# Patient Record
Sex: MALE | Race: BLACK OR AFRICAN AMERICAN | Employment: UNEMPLOYED | URBAN - METROPOLITAN AREA
[De-identification: names, ages, dates, MRNs, and addresses within clinical notes are randomized per-mention and may not be internally consistent; named-entity substitution may affect disease eponyms.]

---

## 2017-01-11 PROBLEM — Z87.448 HISTORY OF HEMATURIA: Status: ACTIVE | Noted: 2017-01-11

## 2017-02-14 ENCOUNTER — HOSPITAL ENCOUNTER (OUTPATIENT)
Dept: ONCOLOGY | Age: 37
Discharge: HOME | End: 2017-02-14

## 2017-02-14 ENCOUNTER — OFFICE VISIT (OUTPATIENT)
Dept: ONCOLOGY | Age: 37
End: 2017-02-14

## 2017-02-14 VITALS
TEMPERATURE: 97.5 F | SYSTOLIC BLOOD PRESSURE: 110 MMHG | WEIGHT: 165 LBS | BODY MASS INDEX: 25.09 KG/M2 | HEART RATE: 80 BPM | DIASTOLIC BLOOD PRESSURE: 65 MMHG

## 2017-02-14 DIAGNOSIS — D50.0 IRON DEFICIENCY ANEMIA DUE TO CHRONIC BLOOD LOSS: ICD-10-CM

## 2017-02-14 DIAGNOSIS — D68.59 THROMBOPHILIA (HCC): Primary | ICD-10-CM

## 2017-02-14 DIAGNOSIS — D68.59 THROMBOPHILIA (HCC): ICD-10-CM

## 2017-02-14 LAB
BASO+EOS+MONOS # BLD AUTO: 0.5 K/UL (ref 0–2.3)
BASO+EOS+MONOS # BLD AUTO: 8 % (ref 0.1–17)
DIFFERENTIAL METHOD BLD: NORMAL
ERYTHROCYTE [DISTWIDTH] IN BLOOD BY AUTOMATED COUNT: 13.4 % (ref 11.5–14.5)
HCT VFR BLD AUTO: 42.5 % (ref 36–48)
HGB BLD-MCNC: 15 G/DL (ref 12–16)
LYMPHOCYTES # BLD AUTO: 37 % (ref 14–44)
LYMPHOCYTES # BLD: 2.5 K/UL (ref 1.1–5.9)
MCH RBC QN AUTO: 30.2 PG (ref 25–35)
MCHC RBC AUTO-ENTMCNC: 35.3 G/DL (ref 31–37)
MCV RBC AUTO: 85.5 FL (ref 78–102)
NEUTS SEG # BLD: 3.8 K/UL (ref 1.8–9.5)
NEUTS SEG NFR BLD AUTO: 56 % (ref 40–70)
PLATELET # BLD AUTO: 214 K/UL (ref 140–440)
RBC # BLD AUTO: 4.97 M/UL (ref 4.1–5.1)
WBC # BLD AUTO: 6.8 K/UL (ref 4.5–13)

## 2017-02-14 NOTE — PROGRESS NOTES
Hematology/Oncology  Progress Note    Name: Cheikh Amaral  Date: 2017  : 1980  Primary Care Provider: Rafael Garza MD        Mr. Rebeca Callaway is a 43y.o. year old male with thrombophilia and HX of PE. Current Therapy: Xarelto 20mg daily    Subjective:   Mr. Rebeca Callaway is a 43year old male with a PE following the removal of part of his kidney. His recent work up to determine if there was a genetic link as to the cause of the PE was negative. He was therefore  started on Xarelto 20mg daily. He states his GI issues have resolved and he states he has been doing well. He denies abnormal bleeding or bruising. He states it has been 6 months since he started the xarelto. He has no additional complaints or questions  at this time. The past medical, surgical and social history has been reviewed and remains unchanged. Past Medical History   Diagnosis Date    Anemia     Celiac artery aneurysm (HCC)     Diabetes ([facility] [address]     Hx of renal cell cancer     Hypercholesteremia     Hypertension     IFG (impaired fasting glucose)     Left renal mass     Lower GI bleed     Migraine without aura and with status migrainosus, not intractable     Pulmonary embolism with infarction (HCC)     Renal cell cancer ([facility] [address]     Thyroid nodule     Vitamin D deficiency      Past Surgical History   Procedure Laterality Date    Hx nephrectomy  16     Robotic assisted laparoscopic left partial nephrectomy    Colonoscopy N/A 8/15/2016     COLONOSCOPY, DIAGNOSTIC performed by Bogdan Kessler MD at [address] [place] [address] Colonoscopy N/A 2016     COLONOSCOPY, DIAGNOSTIC with bx performed by Ema Khan MD at [address] [place] [address] Hx vasectomy      Nephrectomy       Social History     Social History    Marital status: LEGALLY      Spouse name: N/A    Number of children: N/A    Years of education: N/A     Occupational History    Not on file.      Social History Main Topics    Smoking status: Former Smoker    Smokeless tobacco: Not on file    Alcohol use No    Drug use: Not on file    Sexual activity: Not on file     Other Topics Concern    Not on file     Social History Narrative     Family History   Problem Relation Age of Onset    Cancer Mother     Hypertension Mother     Hypertension Father     Cancer Maternal Uncle      Current Outpatient Prescriptions   Medication Sig Dispense Refill    rivaroxaban (XARELTO) 20 mg tab tablet Take 1 Tab by mouth daily. Begin taking 20mg tab once daily, after completing 21 days of 15mg twice daily 30 Tab 4    furosemide (LASIX) 20 mg tablet Take 20 mg by mouth daily.  temazepam (RESTORIL) 15 mg capsule       traMADol (ULTRAM) 50 mg tablet Take 50 mg by mouth daily as needed.  docusate sodium (COLACE) 100 mg capsule Take 100 mg by mouth daily.  therapeutic multivitamin (THERAGRAN) tablet Take 1 Tab by Mouth Once a Day.  amLODIPine (NORVASC) 10 mg tablet Take 10 mg by mouth daily. 6    atorvastatin (LIPITOR) 20 mg tablet Take 20 mg by mouth daily. 6    losartan (COZAAR) 25 mg tablet Take 25 mg by mouth daily. 6    ASACOL  mg DR tablet Take 800 mg by mouth three (3) times daily.  metoprolol (LOPRESSOR) 25 mg tablet Take 25 mg by mouth two (2) times a day. 6    nortriptyline (PAMELOR) 50 mg capsule Take 50 mg by mouth nightly. 0    sertraline (ZOLOFT) 50 mg tablet Take 50 mg by mouth daily.  SUMAtriptan (IMITREX) 100 mg tablet   0    topiramate (TOPAMAX) 50 mg tablet 50 mg two (2) times a day. Review of Systems  General :The patient has no complaints and there is no physical distress evident. Psychological : patient denies having any psychological symptoms such as hallucinations depression or anxiety. Ophthalmic:the patient denies having any visual impairment or eye discomfort. ENT: there are no abnormalities reported.    Allergy and Immunology:the patient denies having any seasonal allergies or allergies to medications other than those already outlined above. Hematological and Lymphatic: the patient denies having any bruising, bleeding or lymphadenopathy. Endocrine: the patient denies having any heat or cold intolerance. There is no history of diabetes or thyroid disorders. Breast: the patient denies having any history of breast mass or lumps. Respiratory:the patient denies having any cough, shortness of breath, or dyspnea on exertion. Cardiovascular: there are no complaints of chest pain, palpitations, chest pounding, or dyspnea on exertion. Gastrointestinal: the patient denies having nausea, emesis, diarrhea, constipation. Occasional blood in the stools due to hemorrhoids. Genito-Urinary: the patient denies having urinary urgency, frequency, or dysuria. Musculoskeletal: with the exception of mild arthralgias the patient has no other musculoskeletal complaints. Neurological:  denies having any numbness, tingling, or neurologic deficits. Dermatological: patient denies having any unexplained rash, skin ulcerations, or hives. Objective:     Visit Vitals    /65    Pulse 80    Temp 97.5 °F (36.4 °C)    Wt 74.8 kg (165 lb)    BMI 25.09 kg/m2         Physical Exam:   General: Well appearing, in NAD  Skin: examination of the skin reveals no bruising, rash or petechiae  HEENT: Normocephalic, atraumatic. Conjunctiva and sclera are clear. Pupils are equal, round and reactive to light. EOMs are intact. ENT without oral mucosal lesions, stomatitis or thrush  Neck: supple without lymphadenopathy, JVD or thyromegaly  Lymphatics: no palpable cervical, supraclavicular, axillary or inguinal lymphadenopathy  Lungs: clear breath sounds bilaterally, no rhonchi or wheezes noted  Heart: Regular rate and rhythm, no murmurs, rubs or gallops, S1-S2 noted.  Positive peripheral pulses bilaterally upper and lower extremities  Abdomen: soft, non-tender, non-distended, no HSM, positive bowel sounds  Extremities: without clubbing, cyanosis or edema  Neurologic: no focal deficits, steady gait, Alert and oriented x 3. Psychologic: mood and affect are appropriate, no anxiety or depression noted    Laboratory Data:     Results for orders placed or performed during the hospital encounter of 02/14/17   CBC WITH 3 PART DIFF     Status: None   Result Value Ref Range Status    WBC 6.8 4.5 - 13.0 K/uL Final    RBC 4.97 4.10 - 5.10 M/uL Final    HGB 15.0 12.0 - 16.0 g/dL Final    HCT 42.5 36 - 48 % Final    MCV 85.5 78 - 102 FL Final    MCH 30.2 25.0 - 35.0 PG Final    MCHC 35.3 31 - 37 g/dL Final    RDW 13.4 11.5 - 14.5 % Final    PLATELET 261 533 - 680 K/uL Final    NEUTROPHILS 56 40 - 70 % Final    MIXED CELLS 8 0.1 - 17 % Final    LYMPHOCYTES 37 14 - 44 % Final    ABS. NEUTROPHILS 3.8 1.8 - 9.5 K/UL Final    ABS. MIXED CELLS 0.5 0.0 - 2.3 K/uL Final    ABS. LYMPHOCYTES 2.5 1.1 - 5.9 K/UL Final     Comment: Test performed at [place] [address] Location. Results Reviewed by Medical Director.     DF AUTOMATED   Final            Patient Active Problem List   Diagnosis Code    Left renal mass N28.89    Celiac artery aneurysm (HCC) I72.8    Vitamin D deficiency E55.9    Migraine without aura and responsive to treatment G43.009    Lower gastrointestinal hemorrhage K92.2    Impaired fasting glucose R73.01    Hypertension I10    Hypercholesterolemia E78.00    Hemorrhoids K64.9    Essential hypertension I10    Chest pain R07.9    Hematochezia K92.1    Anxiety F41.9    Pulmonary embolus, right (HCC) I26.99    Thrombophilia (HCC) D68.59    Iron deficiency anemia due to chronic blood loss D50.0    Renal cell cancer (HCC) C64.9    Diabetes (HCC) E11.9    Thyroid nodule E04.1    Pulmonary embolism with infarction (HCC) I26.99    Migraine without aura and with status migrainosus, not intractable G43.001    Lower GI bleed K92.2    IFG (impaired fasting glucose) R73.01    Hypercholesteremia E78.00    Personal history of other malignant neoplasm of kidney Z85.528    Anemia D64.9    Depression F32.9    History of pulmonary embolism Z86.711    Malignant neoplasm of kidney (HCC) C64.9    Controlled type 2 diabetes mellitus without complication (HCC) J83.2    History of hematuria Z87.448         Assessment:     1. Thrombophilia ([facility] [address]    2. Iron deficiency anemia due to chronic blood loss          Plan: Thrombophilia:  The patient will continue to take the Xarelto 20 mg daily. He has completed his 6 months of Xarelto as prescribed. I will order a CTA of the chest, abdomen and pelvis to determine the status of the clot. Once the results are received the patient will be contacted to let him know if the Xarelto needs to be continued or if he can stop the xarelto and take an ASA daily. We will see him in 3 months or sooner if indicated.       Follow-up Disposition: Not on File   Orders Placed This Encounter    COMPLETE CBC & AUTO DIFF WBC    InHouse CBC ([employer])     Standing Status:   Future     Number of Occurrences:   1     Standing Expiration Date:   6/28/8023    METABOLIC PANEL, COMPREHENSIVE     Standing Status:   Future     Standing Expiration Date:   2/15/2018       Sulma Higgins NP  2/14/2017

## 2017-02-15 LAB
A-G RATIO,AGRAT: 1.7 RATIO (ref 1.1–2.6)
ALBUMIN SERPL-MCNC: 4.7 G/DL (ref 3.5–5)
ALP SERPL-CCNC: 75 U/L (ref 25–115)
ALT SERPL-CCNC: 18 U/L (ref 5–40)
ANION GAP SERPL CALC-SCNC: 20 MMOL/L
AST SERPL W P-5'-P-CCNC: 23 U/L (ref 10–37)
BILIRUB SERPL-MCNC: 0.3 MG/DL (ref 0.2–1.2)
BUN SERPL-MCNC: 19 MG/DL (ref 6–22)
CALCIUM SERPL-MCNC: 9.8 MG/DL (ref 8.4–10.4)
CHLORIDE SERPL-SCNC: 100 MMOL/L (ref 98–110)
CO2 SERPL-SCNC: 22 MMOL/L (ref 20–32)
CREAT SERPL-MCNC: 1.3 MG/DL (ref 0.5–1.2)
GFRAA, 66117: >60
GFRNA, 66118: 59.7
GLOBULIN,GLOB: 2.8 G/DL (ref 2–4)
GLUCOSE SERPL-MCNC: 117 MG/DL (ref 65–99)
POTASSIUM SERPL-SCNC: 3.2 MMOL/L (ref 3.5–5.5)
PROT SERPL-MCNC: 7.5 G/DL (ref 6.4–8.3)
SODIUM SERPL-SCNC: 142 MMOL/L (ref 133–145)

## 2017-03-13 ENCOUNTER — DOCUMENTATION ONLY (OUTPATIENT)
Dept: ONCOLOGY | Age: 37
End: 2017-03-13

## 2017-10-16 PROBLEM — G47.33 SLEEP APNEA, OBSTRUCTIVE: Status: ACTIVE | Noted: 2017-08-10

## 2018-06-20 PROBLEM — N46.8 INFERTILITY, MALE, EXTRATESTICULAR CAUSE: Status: ACTIVE | Noted: 2018-06-20

## 2019-04-18 ENCOUNTER — HOSPITAL ENCOUNTER (EMERGENCY)
Dept: HOSPITAL 62 - ER | Age: 39
Discharge: HOME | End: 2019-04-18
Payer: OTHER GOVERNMENT

## 2019-04-18 VITALS — DIASTOLIC BLOOD PRESSURE: 103 MMHG | SYSTOLIC BLOOD PRESSURE: 152 MMHG

## 2019-04-18 DIAGNOSIS — R63.0: ICD-10-CM

## 2019-04-18 DIAGNOSIS — R19.5: Primary | ICD-10-CM

## 2019-04-18 DIAGNOSIS — R51: ICD-10-CM

## 2019-04-18 DIAGNOSIS — R10.9: ICD-10-CM

## 2019-04-18 LAB
ADD MANUAL DIFF: NO
ALBUMIN SERPL-MCNC: 4.4 G/DL (ref 3.5–5)
ALP SERPL-CCNC: 62 U/L (ref 38–126)
ALT SERPL-CCNC: 28 U/L (ref 21–72)
ANION GAP SERPL CALC-SCNC: 6 MMOL/L (ref 5–19)
APPEARANCE UR: CLEAR
APTT BLD: 31.8 SEC (ref 23.5–35.8)
APTT PPP: YELLOW S
AST SERPL-CCNC: 32 U/L (ref 17–59)
BASOPHILS # BLD AUTO: 0.1 10^3/UL (ref 0–0.2)
BASOPHILS NFR BLD AUTO: 0.7 % (ref 0–2)
BILIRUB DIRECT SERPL-MCNC: 0.2 MG/DL (ref 0–0.4)
BILIRUB SERPL-MCNC: 0.5 MG/DL (ref 0.2–1.3)
BILIRUB UR QL STRIP: NEGATIVE
BUN SERPL-MCNC: 17 MG/DL (ref 7–20)
CALCIUM: 10.1 MG/DL (ref 8.4–10.2)
CHLORIDE SERPL-SCNC: 105 MMOL/L (ref 98–107)
CO2 SERPL-SCNC: 29 MMOL/L (ref 22–30)
EOSINOPHIL # BLD AUTO: 0.1 10^3/UL (ref 0–0.6)
EOSINOPHIL NFR BLD AUTO: 1.9 % (ref 0–6)
ERYTHROCYTE [DISTWIDTH] IN BLOOD BY AUTOMATED COUNT: 13.9 % (ref 11.5–14)
GLUCOSE SERPL-MCNC: 79 MG/DL (ref 75–110)
GLUCOSE UR STRIP-MCNC: NEGATIVE MG/DL
HCT VFR BLD CALC: 47.3 % (ref 37.9–51)
HGB BLD-MCNC: 16 G/DL (ref 13.5–17)
INR PPP: 0.95
KETONES UR STRIP-MCNC: NEGATIVE MG/DL
LYMPHOCYTES # BLD AUTO: 3 10^3/UL (ref 0.5–4.7)
LYMPHOCYTES NFR BLD AUTO: 41.6 % (ref 13–45)
MCH RBC QN AUTO: 30.1 PG (ref 27–33.4)
MCHC RBC AUTO-ENTMCNC: 33.9 G/DL (ref 32–36)
MCV RBC AUTO: 89 FL (ref 80–97)
MONOCYTES # BLD AUTO: 0.5 10^3/UL (ref 0.1–1.4)
MONOCYTES NFR BLD AUTO: 7.1 % (ref 3–13)
NEUTROPHILS # BLD AUTO: 3.5 10^3/UL (ref 1.7–8.2)
NEUTS SEG NFR BLD AUTO: 48.7 % (ref 42–78)
NITRITE UR QL STRIP: NEGATIVE
PH UR STRIP: 6 [PH] (ref 5–9)
PLATELET # BLD: 192 10^3/UL (ref 150–450)
POTASSIUM SERPL-SCNC: 4 MMOL/L (ref 3.6–5)
PROT SERPL-MCNC: 7.9 G/DL (ref 6.3–8.2)
PROT UR STRIP-MCNC: NEGATIVE MG/DL
PROTHROMBIN TIME: 13.1 SEC (ref 11.4–15.4)
RBC # BLD AUTO: 5.33 10^6/UL (ref 4.35–5.55)
SODIUM SERPL-SCNC: 140.4 MMOL/L (ref 137–145)
SP GR UR STRIP: 1.01
TOTAL CELLS COUNTED % (AUTO): 100 %
UROBILINOGEN UR-MCNC: NEGATIVE MG/DL (ref ?–2)
WBC # BLD AUTO: 7.3 10^3/UL (ref 4–10.5)

## 2019-04-18 PROCEDURE — 99284 EMERGENCY DEPT VISIT MOD MDM: CPT

## 2019-04-18 PROCEDURE — 86850 RBC ANTIBODY SCREEN: CPT

## 2019-04-18 PROCEDURE — 85730 THROMBOPLASTIN TIME PARTIAL: CPT

## 2019-04-18 PROCEDURE — 80053 COMPREHEN METABOLIC PANEL: CPT

## 2019-04-18 PROCEDURE — 85610 PROTHROMBIN TIME: CPT

## 2019-04-18 PROCEDURE — 96375 TX/PRO/DX INJ NEW DRUG ADDON: CPT

## 2019-04-18 PROCEDURE — 85025 COMPLETE CBC W/AUTO DIFF WBC: CPT

## 2019-04-18 PROCEDURE — 86900 BLOOD TYPING SEROLOGIC ABO: CPT

## 2019-04-18 PROCEDURE — 81001 URINALYSIS AUTO W/SCOPE: CPT

## 2019-04-18 PROCEDURE — 36415 COLL VENOUS BLD VENIPUNCTURE: CPT

## 2019-04-18 PROCEDURE — 96361 HYDRATE IV INFUSION ADD-ON: CPT

## 2019-04-18 PROCEDURE — 74177 CT ABD & PELVIS W/CONTRAST: CPT

## 2019-04-18 PROCEDURE — 96374 THER/PROPH/DIAG INJ IV PUSH: CPT

## 2019-04-18 PROCEDURE — 86901 BLOOD TYPING SEROLOGIC RH(D): CPT

## 2019-04-18 NOTE — RADIOLOGY REPORT (SQ)
EXAM DESCRIPTION:  CT ABD/PELVIS WITH IV ONLY



COMPLETED DATE/TIME:  4/18/2019 5:21 pm



REASON FOR STUDY:  abdominal pain



COMPARISON:  None.



TECHNIQUE:  CT scan of the abdomen and pelvis performed using helical scanning technique with dynamic
 intravenous contrast injection.  No oral contrast. Images reviewed with lung, soft tissue, and bone 
windows. Reconstructed coronal and sagittal MPR images reviewed. Delayed images for evaluation of the
 urinary system also acquired. All images stored on PACS.

All CT scanners at this facility use dose modulation, iterative reconstruction, and/or weight based d
osing when appropriate to reduce radiation dose to as low as reasonably achievable (ALARA).

[facility]: Dose Right  [facility]: CareDose    [facility]: Dose Right    [facility]: Teradose 4D    [facility]: Smart Technologies



CONTRAST TYPE AND DOSE:  contrast/concentration: Isovue 350.00 mg/ml; Total Contrast Delivered: 94.0 
ml; Total Saline Delivered: 71.0 ml



RENAL FUNCTION:  None required. The patient is less than 50 years old.



RADIATION DOSE:  CT Rad equipment meets quality standard of care and radiation dose reduction techniq
ues were employed. CTDIvol: 6.6 - 9.1 mGy. DLP: 833 mGy-cm..



LIMITATIONS:  None.



FINDINGS:  LOWER CHEST: No significant findings. No nodules or infiltrates.

LIVER: Normal size. No masses.  No dilated ducts.

SPLEEN: Normal size. No focal lesions.

PANCREAS: No masses. No significant calcifications. No adjacent inflammation or peripancreatic fluid 
collections. Pancreatic duct not dilated.

GALLBLADDER: No identified stones by CT criteria. No inflammatory changes to suggest cholecystitis.

ADRENAL GLANDS: No significant masses or asymmetry.

RIGHT KIDNEY AND URETER: No solid masses.   Nonobstructive calculus of the superior pole.   No hydron
ephrosis or hydroureter.

LEFT KIDNEY AND URETER: No solid masses.   Tiny nonobstructive calculus of the inferior pole.   No hy
dronephrosis or hydroureter.

AORTA AND VESSELS: No aneurysm. No dissection. Renal arteries, SMA, celiac without stenosis.

RETROPERITONEUM: No retroperitoneal adenopathy, hemorrhage or masses.

BOWEL AND PERITONEAL CAVITY: No masses or inflammatory changes. No free fluid or peritoneal masses.  
The sigmoid colon is very redundant.  There is a segment of narrowed appearing sigmoid colon approxim
ately 3 cm in length (series 3, image 68) with gas filled colon proximally.  There is stool distally 
in the rectum.

APPENDIX: Surgically absent.

PELVIS: No mass.  No free fluid. Normal bladder.

ABDOMINAL WALL: No masses. No hernias.

BONES: No significant or acute findings.

OTHER: No other significant finding.



IMPRESSION:  1.  No definite CT findings to explain left lower quadrant abdominal pain.  The sigmoid 
colon is very redundant.  There is a segment of narrowed appearing sigmoid colon approximately 3 cm i
n length with gas-filled colon proximally.  There is stool distally in the rectum.  Although this is 
likely peristaltic bowel, stricture can have this appearance.  Correlate for clinical history of rele
vant disorder such as inflammatory bowel disease related to stricture.  This can be further evaluated
 by direct colonoscopic visualization if desired.

2.  Nonobstructive bilateral nephrolithiasis.



TECHNICAL DOCUMENTATION:  JOB ID:  [id number]

Quality ID # 436: Final reports with documentation of one or more dose reduction techniques (e.g., Au
tomated exposure control, adjustment of the mA and/or kV according to patient size, use of iterative 
reconstruction technique)

 2011 [employer]- All Rights Reserved



Reading location - IP/workstation name: [email or web address]

## 2019-04-18 NOTE — ER DOCUMENT REPORT
ED Medical Screen (RME)





- General


Chief Complaint: Bloody Stools


Stated Complaint: BLOOD IN STOOL


Time Seen by Provider: 04/18/19 12:31


Mode of Arrival: Ambulatory


Information source: Patient


Notes: 





39-year-old male presented to ED for complaint of blood in his stools x3 since 

Tuesday.  He states there was a little bit on Tuesday and on a lot more each 

day.  He states today that was a lot.  Patient has a history of kidney cancer 

with part of his left kidney removed.  He has had a aortic aneurysm that was not

repaired just use medication and he had to have a history of high blood pressure

.  He is alert oriented respirations regular and unlabored speaking in full 

sentences walks with a even steady gait.  He states he does not smoke drink or 

drugs and lives with his family.








I have greeted and performed a rapid initial assessment of this patient.  A 

comprehensive ED assessment and evaluation of the patient, analysis of test 

results and completion of medical decision making process will be conducted by 

an additional ED providers.


TRAVEL OUTSIDE OF THE [place] IN LAST 30 DAYS: No





- Related Data


Allergies/Adverse Reactions: 


                                        





No Known Allergies Allergy (Verified 04/18/19 11:47)


   








Home Medications: HCTZ.  ZOLOFT.  CYCLOBENAZAPRIL.  IRON.  LOSATRTAN.  

CETIRIZINE.  DOXYCYCLINE.  HYDROCHLORATHIAZIDE.  VIT D3.  ASA.  PROETHAZINE.  

COLACE.  TRAZADONE





Past Medical History





- Social History


Chew tobacco use (# tins/day): No


Frequency of alcohol use: None


Drug Abuse: None





- Past Medical History


Cardiac Medical History: Reports: Hx Hypertension


Renal/ Medical History: Denies: Hx Peritoneal Dialysis


Past Surgical History: Reports: Hx Kidney (Renal Surgery) - left sided partial 

removal





Physical Exam





- Vital signs


Vitals: 





                                        











Temp Pulse Resp BP Pulse Ox


 


 97.7 F   78   16   146/96 H  97 


 


 04/18/19 11:50  04/18/19 11:50  04/18/19 11:50  04/18/19 11:50  04/18/19 11:50














Course





- Vital Signs


Vital signs: 





                                        











Temp Pulse Resp BP Pulse Ox


 


 97.7 F   78   16   146/96 H  97 


 


 04/18/19 11:50  04/18/19 11:50  04/18/19 11:50  04/18/19 11:50  04/18/19 11:50

## 2019-04-18 NOTE — ER DOCUMENT REPORT
ED General





- General


Chief Complaint: Bloody Stools


Stated Complaint: BLOOD IN STOOL


Time Seen by Provider: 04/18/19 12:31


Primary Care Provider: 


KVNG ENCISO MD [ACTIVE STAFF] - Follow up in 3-5 days


[facility] [Primary Care Provider] - Follow up as needed


Mode of Arrival: Ambulatory


TRAVEL OUTSIDE OF THE [place] IN LAST 30 DAYS: No





- HPI


Notes: 





Patient is a 39-year-old male that presents to the emergency department for 

chief complaint of blood in his stool.


Patient reports for the last 3 days he has had bright red blood in his stool.  

He states he has 2 formed bowel movements daily.  Each 1 has had streaks of 

bright red blood on the paper and in the toilet bowl.  He states the stool also 

appears bright red.  He denies any maroon or black stools.  He reports some mild

diffuse abdominal pain that is intermittent and crampy in nature.  He denies 

associated fevers, nausea and vomiting.  He does report decreased oral intake 

because of not feeling well.  Patient states since onset of the bloody stools he

is also had a headache.  He describes it as a throbbing pressure that has 

associated photophobia.  The pain is located around his right parietal region.  

He does report history of migraines in the past but states he gets them very 

rarely.  He states this headache was gradual in onset and has been constant for 

the last 3 days.  He did get some symptomatic relief at home after taking 

Tylenol yesterday.  He denies any associated vision changes, numbness, weakness 

and neck stiffness patient states he has had about 6 colonoscopies in his li

fetime and all have been normal or shown small polyps.  He denies history of GI 

bleeding in the past.  He denies being on anticoagulation currently.





Past Medical History: Kidney cancer, PE, hypertension





Past Surgical History: Left partial nephrectomy, appendectomy





Social History: Denies tobacco and alcohol use





Family History: Reviewed and noncontributory for presenting illness





Allergies: Reviewed, see documented allergy list.








REVIEW OF SYSTEMS:





CONSTITUTIONAL : 





No fever





No chills





No diaphoresis





No recent illness





EENT:





No vision changes





No congestion





No sore throat  





CARDIOVASCULAR:





No chest pain





No palpitations





RESPIRATORY:





No shortness of breath





No cough





No difficulty breathing





GASTROINTESTINAL: 





 abdominal pain





No nausea





No vomiting





No diarrhea





Bright red blood in stool





GENITOURINARY:





No dysuria





No hematuria





No difficulty urinating





MUSCULOSKELETAL:





No back pain





No leg pain





No arm pain





SKIN:  





No rashes





No lesions





LYMPHATIC: 





No swollen, enlarged glands.





NEUROLOGICAL: 





No lightheadedness





headache





No weakness





No paresthesias





PSYCHIATRIC:





No anxiety





No depression 








PHYSICAL EXAMINATION:





Vital signs reviewed, nursing noted reviewed.





GENERAL: Well-appearing, well-nourished and in no acute distress.





HEAD: Atraumatic, normocephalic.





EYES: Eyes appear normal, extraocular movements intact, sclera anicteric, 

conjunctiva are normal.





ENT: nares patent, oropharynx clear without exudates.  Moist mucous membranes.





NECK: Normal range of motion, supple without lymphadenopathy





LUNGS: Breath sounds clear to auscultation bilaterally and equal.  No wheezes 

rales or rhonchi.





HEART: Regular rate and rhythm without murmurs





ABDOMEN: Soft, mild right lateral abdominal tenderness, normoactive bowel 

sounds.  No rebound, guarding, or rigidity. No masses appreciated.





EXTREMITIES: Nontender, good range of motion, no pitting or edema. 





NEUROLOGICAL: No focal neurological deficits. Moves all extremities 

spontaneously Motor and sensory grossly intact on exam.





PSYCH: Normal mood, normal affect.





SKIN: Warm, Dry, normal turgor, no rashes or lesions noted on exposed skin











- Related Data


Allergies/Adverse Reactions: 


                                        





No Known Allergies Allergy (Verified 04/18/19 11:47)


   








Home Medications: HCTZ.  ZOLOFT.  CYCLOBENAZAPRIL.  IRON.  LOSATRTAN.  

CETIRIZINE.  DOXYCYCLINE.  HYDROCHLORATHIAZIDE.  VIT D3.  ASA.  PROETHAZINE.  

COLACE.  TRAZADONE





Past Medical History





- General


Information source: Patient





- Social History


Smoking Status: Never Smoker


Chew tobacco use (# tins/day): No


Frequency of alcohol use: None


Drug Abuse: None


Family History: Reviewed & Not Pertinent


Patient has suicidal ideation: No


Patient has homicidal ideation: No





- Past Medical History


Cardiac Medical History: Reports: Hx Hypertension


Renal/ Medical History: Denies: Hx Peritoneal Dialysis


Past Surgical History: Reports: Hx Kidney (Renal Surgery) - left sided partial 

removal





Physical Exam





- Vital signs


Vitals: 


                                        











Temp Pulse Resp BP Pulse Ox


 


 97.7 F   78   16   146/96 H  97 


 


 04/18/19 11:50  04/18/19 11:50  04/18/19 11:50  04/18/19 11:50  04/18/19 11:50














Course





- Re-evaluation


Re-evalutation: 





04/18/19 16:57


Vitals reviewed per nursing notes reviewed.  Patient's abdomen is soft with some

mild right-sided tenderness.  He is status post appendectomy.  Patient has no 

leukocytosis.  He appears well-hydrated with normal kidney function and no 

electrolyte derangement.  He is not anemic and has a stable hemoglobin.  Patient

does have a history of renal cancer and had been advised to have frequent 

colonoscopies to screen for further cancer.  With his current presentation of 

bloody stools without diarrhea fevers or emesis I am concerned for possible 

malignant mass causing the bleeding.  CT scan will be performed of his abdomen 

and pelvis today.  Patient was also recommended that he needs colonoscopy in the

near future.


04/18/19 18:13


Patient CT scan shows collapse of the colon distally with some proximal air.  

Patient has been able to pass bowel movements daily and has passed gas in the 

ER.  He does not have symptoms of obstruction to suggest a stricture.  Likely 

this was peristalsis.  His abdominal exam is soft he has no left lower quadrant 

tenderness, he is still mildly tender on the right side.  Patient was encouraged

to follow with GI for outpatient colonoscopy.  He was extensively counseled on 

return precautions and told to have a low threshold to return to the emergency 

room if the bleeding is continuing or worsening.  He is in agreement with this 

plan of care and stable at discharge





Laboratory











  04/18/19 04/18/19 04/18/19





  12:55 12:55 12:55


 


WBC  7.3  


 


RBC  5.33  


 


Hgb  16.0  


 


Hct  47.3  


 


MCV  89  


 


MCH  30.1  


 


MCHC  33.9  


 


RDW  13.9  


 


Plt Count  192  


 


Seg Neutrophils %  48.7  


 


Lymphocytes %  41.6  


 


Monocytes %  7.1  


 


Eosinophils %  1.9  


 


Basophils %  0.7  


 


Absolute Neutrophils  3.5  


 


Absolute Lymphocytes  3.0  


 


Absolute Monocytes  0.5  


 


Absolute Eosinophils  0.1  


 


Absolute Basophils  0.1  


 


PT   


 


INR   


 


APTT   


 


Sodium   140.4 


 


Potassium   4.0 


 


Chloride   105 


 


Carbon Dioxide   29 


 


Anion Gap   6 


 


BUN   17 


 


Creatinine   1.07 


 


Est GFR ( Amer)   > 60 


 


Est GFR (Non-Af Amer)   > 60 


 


Glucose   79 


 


Calcium   10.1 


 


Total Bilirubin   0.5 


 


Direct Bilirubin   0.2 


 


Neonat Total Bilirubin   Not Reportable 


 


Neonat Direct Bilirubin   Not Reportable 


 


Neonat Indirect Bili   Not Reportable 


 


AST   32 


 


ALT   28 


 


Alkaline Phosphatase   62 


 


Total Protein   7.9 


 


Albumin   4.4 


 


Urine Color    YELLOW


 


Urine Appearance    CLEAR


 


Urine pH    6.0


 


Ur Specific Gravity    1.008


 


Urine Protein    NEGATIVE


 


Urine Glucose (UA)    NEGATIVE


 


Urine Ketones    NEGATIVE


 


Urine Blood    NEGATIVE


 


Urine Nitrite    NEGATIVE


 


Urine Bilirubin    NEGATIVE


 


Urine Urobilinogen    NEGATIVE


 


Ur Leukocyte Esterase    NEGATIVE


 


Urine WBC (Auto)    1


 


Urine Mucus (Auto)    RARE


 


Urine Ascorbic Acid    NEGATIVE


 


Blood Type   


 


Antibody Screen   














  04/18/19 04/18/19





  15:50 15:50


 


WBC  


 


RBC  


 


Hgb  


 


Hct  


 


MCV  


 


MCH  


 


MCHC  


 


RDW  


 


Plt Count  


 


Seg Neutrophils %  


 


Lymphocytes %  


 


Monocytes %  


 


Eosinophils %  


 


Basophils %  


 


Absolute Neutrophils  


 


Absolute Lymphocytes  


 


Absolute Monocytes  


 


Absolute Eosinophils  


 


Absolute Basophils  


 


PT  13.1 


 


INR  0.95 


 


APTT  31.8 


 


Sodium  


 


Potassium  


 


Chloride  


 


Carbon Dioxide  


 


Anion Gap  


 


BUN  


 


Creatinine  


 


Est GFR ( Amer)  


 


Est GFR (Non-Af Amer)  


 


Glucose  


 


Calcium  


 


Total Bilirubin  


 


Direct Bilirubin  


 


Neonat Total Bilirubin  


 


Neonat Direct Bilirubin  


 


Neonat Indirect Bili  


 


AST  


 


ALT  


 


Alkaline Phosphatase  


 


Total Protein  


 


Albumin  


 


Urine Color  


 


Urine Appearance  


 


Urine pH  


 


Ur Specific Gravity  


 


Urine Protein  


 


Urine Glucose (UA)  


 


Urine Ketones  


 


Urine Blood  


 


Urine Nitrite  


 


Urine Bilirubin  


 


Urine Urobilinogen  


 


Ur Leukocyte Esterase  


 


Urine WBC (Auto)  


 


Urine Mucus (Auto)  


 


Urine Ascorbic Acid  


 


Blood Type   A POSITIVE


 


Antibody Screen   NEGATIVE











                                        





Abdomen/Pelvis CT  04/18/19 16:24


IMPRESSION:  1.  No definite CT findings to explain left lower quadrant 

abdominal pain.  The sigmoid colon is very redundant.  There is a segment of 

narrowed appearing sigmoid colon approximately 3 cm in length with gas-filled 

colon proximally.  There is stool distally in the rectum.  Although this is 

likely peristaltic bowel, stricture can have this appearance.  Correlate for 

clinical history of relevant disorder such as inflammatory bowel disease related

to stricture.  This can be further evaluated by direct colonoscopic 

visualization if desired.


2.  Nonobstructive bilateral nephrolithiasis.


 














- Vital Signs


Vital signs: 


                                        











Temp Pulse Resp BP Pulse Ox


 


 97.7 F   78   16   146/96 H  97 


 


 04/18/19 11:50  04/18/19 11:50  04/18/19 11:50  04/18/19 11:50  04/18/19 11:50














- Laboratory


Result Diagrams: 


                                 04/18/19 12:55





                                 04/18/19 12:55





Discharge





- Discharge


Clinical Impression: 


 Blood in stool, oscar





Condition: Stable


Disposition: HOME, SELF-CARE


Instructions:  Rectal Bleeding, Unclear Cause ([facility])


Additional Instructions: 


Return to the emergency room if your bleeding continues or worsens 





please return to the emergency department if you have any worsening, or concern 

of your symptoms.





Please return to the emergency department if you develop chest pain, difficulty 

breathing, severe abdominal pain, or ongoing vomiting.





Please follow-up with your primary care physician in 2-3 days and any other 

recommended physicians.





If prescribed, take all medications as directed. 





If you have any questions or concerns do not hesitate to return the emergency 

department for evaluation.





Follow-up with Dr. Enciso for reevaluation and likely colonoscopy.











Forms:  Return to Work


Referrals: 


[facility] [Primary Care Provider] - Follow up as needed


KVNG ENCISO MD [ACTIVE STAFF] - Follow up in 3-5 days

## 2019-10-24 ENCOUNTER — HOSPITAL ENCOUNTER (EMERGENCY)
Dept: HOSPITAL 62 - ER | Age: 39
Discharge: HOME | End: 2019-10-24
Payer: OTHER GOVERNMENT

## 2019-10-24 VITALS — SYSTOLIC BLOOD PRESSURE: 150 MMHG | DIASTOLIC BLOOD PRESSURE: 96 MMHG

## 2019-10-24 DIAGNOSIS — Z86.711: ICD-10-CM

## 2019-10-24 DIAGNOSIS — I10: ICD-10-CM

## 2019-10-24 DIAGNOSIS — R06.02: ICD-10-CM

## 2019-10-24 DIAGNOSIS — E11.9: ICD-10-CM

## 2019-10-24 DIAGNOSIS — R07.89: Primary | ICD-10-CM

## 2019-10-24 LAB
ADD MANUAL DIFF: NO
ALBUMIN SERPL-MCNC: 4.3 G/DL (ref 3.5–5)
ALP SERPL-CCNC: 70 U/L (ref 38–126)
ANION GAP SERPL CALC-SCNC: 8 MMOL/L (ref 5–19)
APPEARANCE UR: CLEAR
APTT BLD: 30.8 SEC (ref 23.5–35.8)
APTT PPP: YELLOW S
AST SERPL-CCNC: 34 U/L (ref 17–59)
BASOPHILS # BLD AUTO: 0 10^3/UL (ref 0–0.2)
BASOPHILS NFR BLD AUTO: 0.2 % (ref 0–2)
BILIRUB DIRECT SERPL-MCNC: 0 MG/DL (ref 0–0.4)
BILIRUB SERPL-MCNC: 0.4 MG/DL (ref 0.2–1.3)
BILIRUB UR QL STRIP: NEGATIVE
BUN SERPL-MCNC: 15 MG/DL (ref 7–20)
CALCIUM: 9.7 MG/DL (ref 8.4–10.2)
CHLORIDE SERPL-SCNC: 103 MMOL/L (ref 98–107)
CK MB SERPL-MCNC: 0.86 NG/ML (ref ?–4.55)
CK SERPL-CCNC: 286 U/L (ref 55–170)
CO2 SERPL-SCNC: 28 MMOL/L (ref 22–30)
EOSINOPHIL # BLD AUTO: 0.1 10^3/UL (ref 0–0.6)
EOSINOPHIL NFR BLD AUTO: 1.1 % (ref 0–6)
ERYTHROCYTE [DISTWIDTH] IN BLOOD BY AUTOMATED COUNT: 13.5 % (ref 11.5–14)
GLUCOSE SERPL-MCNC: 96 MG/DL (ref 75–110)
GLUCOSE UR STRIP-MCNC: NEGATIVE MG/DL
HCT VFR BLD CALC: 46.1 % (ref 37.9–51)
HGB BLD-MCNC: 15.6 G/DL (ref 13.5–17)
INR PPP: 0.98
KETONES UR STRIP-MCNC: NEGATIVE MG/DL
LYMPHOCYTES # BLD AUTO: 3.4 10^3/UL (ref 0.5–4.7)
LYMPHOCYTES NFR BLD AUTO: 43.8 % (ref 13–45)
MCH RBC QN AUTO: 29.6 PG (ref 27–33.4)
MCHC RBC AUTO-ENTMCNC: 33.9 G/DL (ref 32–36)
MCV RBC AUTO: 88 FL (ref 80–97)
MONOCYTES # BLD AUTO: 0.6 10^3/UL (ref 0.1–1.4)
MONOCYTES NFR BLD AUTO: 7.5 % (ref 3–13)
NEUTROPHILS # BLD AUTO: 3.7 10^3/UL (ref 1.7–8.2)
NEUTS SEG NFR BLD AUTO: 47.4 % (ref 42–78)
NITRITE UR QL STRIP: NEGATIVE
NT PRO BNP: < 11 PG/ML (ref ?–125)
PH UR STRIP: 5 [PH] (ref 5–9)
PLATELET # BLD: 207 10^3/UL (ref 150–450)
POTASSIUM SERPL-SCNC: 3.9 MMOL/L (ref 3.6–5)
PROT SERPL-MCNC: 7.7 G/DL (ref 6.3–8.2)
PROT UR STRIP-MCNC: NEGATIVE MG/DL
PROTHROMBIN TIME: 13 SEC (ref 11.4–15.4)
RBC # BLD AUTO: 5.27 10^6/UL (ref 4.35–5.55)
SP GR UR STRIP: 1.02
TOTAL CELLS COUNTED % (AUTO): 100 %
TROPONIN I SERPL-MCNC: < 0.012 NG/ML
UROBILINOGEN UR-MCNC: NEGATIVE MG/DL (ref ?–2)
WBC # BLD AUTO: 7.8 10^3/UL (ref 4–10.5)

## 2019-10-24 PROCEDURE — 93010 ELECTROCARDIOGRAM REPORT: CPT

## 2019-10-24 PROCEDURE — 96374 THER/PROPH/DIAG INJ IV PUSH: CPT

## 2019-10-24 PROCEDURE — 36415 COLL VENOUS BLD VENIPUNCTURE: CPT

## 2019-10-24 PROCEDURE — 82553 CREATINE MB FRACTION: CPT

## 2019-10-24 PROCEDURE — 85025 COMPLETE CBC W/AUTO DIFF WBC: CPT

## 2019-10-24 PROCEDURE — 85730 THROMBOPLASTIN TIME PARTIAL: CPT

## 2019-10-24 PROCEDURE — 82550 ASSAY OF CK (CPK): CPT

## 2019-10-24 PROCEDURE — 84484 ASSAY OF TROPONIN QUANT: CPT

## 2019-10-24 PROCEDURE — 71275 CT ANGIOGRAPHY CHEST: CPT

## 2019-10-24 PROCEDURE — 83735 ASSAY OF MAGNESIUM: CPT

## 2019-10-24 PROCEDURE — 99284 EMERGENCY DEPT VISIT MOD MDM: CPT

## 2019-10-24 PROCEDURE — 93005 ELECTROCARDIOGRAM TRACING: CPT

## 2019-10-24 PROCEDURE — 85610 PROTHROMBIN TIME: CPT

## 2019-10-24 PROCEDURE — 83880 ASSAY OF NATRIURETIC PEPTIDE: CPT

## 2019-10-24 PROCEDURE — 71046 X-RAY EXAM CHEST 2 VIEWS: CPT

## 2019-10-24 PROCEDURE — 81001 URINALYSIS AUTO W/SCOPE: CPT

## 2019-10-24 PROCEDURE — 80053 COMPREHEN METABOLIC PANEL: CPT

## 2019-10-24 NOTE — RADIOLOGY REPORT (SQ)
EXAM DESCRIPTION:  CTA CHEST



COMPLETED DATE/TIME:  10/24/2019 4:50 pm



REASON FOR STUDY:  chest pain hx clots and kidney  cancer



COMPARISON:  None.



TECHNIQUE:  CT scan of the chest performed using helical scanning technique with dynamic intravenous 
contrast injection.  Images reviewed with lung, soft tissue and bone windows.  Reconstructed coronal 
and sagittal MPR images reviewed.

Additional 3 dimensional post-processing performed to develop Maximal Intensity Projection images (MI
P).  All images stored on PACS.

All CT scanners at this facility use dose modulation, iterative reconstruction, and/or weight based d
osing when appropriate to reduce radiation dose to as low as reasonably achievable (ALARA).

[facility]: Dose Right  [facility]: CareDose    [facility]: Dose Right    [facility]: Teradose 4D    [facility]: Smart Technologies



CONTRAST TYPE AND DOSE:  79 mL Omnipaque 350- low osmolar.

Contrast bolus adequate for pulmonary arteries and aorta.



RENAL FUNCTION:  BUN 15 creatinine 1.13



RADIATION DOSE:   .



LIMITATIONS:  None.



FINDINGS:  LUNGS AND PLEURA: No masses, infiltrates, or pneumothorax.  No pleural effusions or pleura
l calcifications.

AORTA AND GREAT VESSELS: No aneurysm. No dissection.

HEART: No pericardial effusion. No significant coronary artery calcifications.

PULMONARY ARTERIES: No emboli visualized in the main pulmonary arteries or the segmental branches.

HILAR AND MEDIASTINAL STRUCTURES: No identified masses or abnormal nodes.

HARDWARE: None in the chest.

UPPER ABDOMEN: No significant findings.  Limited exam.

THYROID AND OTHER SOFT TISSUES: Right lobe of thyroid gland is enlarged and heterogeneous.

BONES: No acute or significant finding.

3D MIPS: Confirm above findings.

OTHER: No other significant finding.



IMPRESSION:  1.  There is no pulmonary embolus.  There is no aortic aneurysm or dissection.

2.  Enlarged right lobe of the thyroid gland.  Recommend thyroid ultrasound.



COMMENT:  Quality ID # [id number]: Final reports with documentation of one or more dose reduction techniques
 (e.g., Automated exposure control, adjustment of the mA and/or kV according to patient size, use of 
iterative reconstruction technique)



TECHNICAL DOCUMENTATION:  JOB ID:  [id number]

 2011 [employer]- All Rights Reserved



Reading location - IP/workstation name: [email or web address]

## 2019-10-24 NOTE — ER DOCUMENT REPORT
ED General Pain





- General


Chief Complaint: Chest Wall Pain


Stated Complaint: SIDE PAIN


Time Seen by Provider: 10/24/19 15:14


Primary Care Provider: 


[facility] [Primary Care Provider] - Follow up as needed


Mode of Arrival: Ambulatory


TRAVEL OUTSIDE OF THE [place] IN LAST 30 DAYS: No





- HPI


Patient complains to provider of: cp/sob


Onset: Other - Pt. with 3 day h/o L-sided CP and shortness of breath.  He has a 

h/o PE but is currently not on blood thinners.  He went to  earlier this am 

and was sent here for further evaluation





- Related Data


Allergies/Adverse Reactions: 


                                        





No Known Allergies Allergy (Verified 10/24/19 15:07)


   











Past Medical History





- General


Information source: Patient





- Social History


Smoking Status: Never Smoker


Chew tobacco use (# tins/day): No


Frequency of alcohol use: None


Drug Abuse: None


Family History: Reviewed & Not Pertinent


Patient has suicidal ideation: No


Patient has homicidal ideation: No





- Past Medical History


Cardiac Medical History: Reports: Hx Hypertension


Neurological Medical History: Reports: Hx Migraine


Endocrine Medical History: Reports: Hx Diabetes Mellitus Type 2


Renal/ Medical History: Denies: Hx Peritoneal Dialysis


Past Surgical History: Reports: Hx Kidney (Renal Surgery) - micro-disection of 

left kidney





Review of Systems





- Review of Systems


Constitutional: No symptoms reported


EENT: No symptoms reported


Cardiovascular: See HPI, Chest pain


Respiratory: See HPI, Short of breath


Gastrointestinal: No symptoms reported


Musculoskeletal: No symptoms reported


Neurological/Psychological: No symptoms reported


-: Yes All other systems reviewed and negative





Physical Exam





- Vital signs


Vitals: 


                                        











Temp Pulse Resp BP Pulse Ox


 


 97.8 F   66   18   157/95 H  98 


 


 10/24/19 15:00  10/24/19 15:00  10/24/19 15:00  10/24/19 15:00  10/24/19 15:00














- General


General appearance: Appears well


In distress: None





- Respiratory


Respiratory status: No respiratory distress


Chest status: Tender - there is min TTP of the L anterior CW diffusely


Breath sounds: Normal





- Cardiovascular


Rhythm: Regular


Heart sounds: Normal auscultation


Murmur: No





- Abdominal


Inspection: Normal


Distension: No distension


Bowel sounds: Normal


Tenderness: Nontender


Organomegaly: No organomegaly





- Extremities


General upper extremity: Normal inspection


General lower extremity: Normal inspection





- Neurological


Neuro grossly intact: Yes


Cognition: Normal


Orientation: AAOx4





Course





- Re-evaluation


Re-evalutation: 





10/24/19 17:50


Pt. feels much better at time of d/c -- denies CP, SOB.  Expressed desire to go 

home with wife





- Vital Signs


Vital signs: 


                                        











Temp Pulse Resp BP Pulse Ox


 


 97.8 F   66   15   146/106 H  98 


 


 10/24/19 15:00  10/24/19 15:00  10/24/19 16:30  10/24/19 16:16  10/24/19 16:30














- Laboratory


Result Diagrams: 


                                 10/24/19 15:35





                                 10/24/19 15:35


Laboratory results interpreted by me: 


                                        











  10/24/19





  15:35


 


Creatine Kinase  286 H














- Diagnostic Test


Radiology reviewed: Reports reviewed - CTA- neg for PE; cxr- neg





- EKG Interpretation by Me


EKG shows normal: Sinus rhythm


Rate: Normal


Rhythm: NSR - nsr without acute change





Discharge





- Discharge


Clinical Impression: 


Chest pain


Qualifiers:


 Chest pain type: unspecified Qualified Code(s): R07.9 - Chest pain, unspecified





Condition: Stable


Disposition: HOME, SELF-CARE


Additional Instructions: 


rest, take meds as prescribed, return if worse


Prescriptions: 


Tramadol HCl [Ultram] 50 mg PO BID #14 tablet


Referrals: 


[facility] [Primary Care Provider] - Follow up as needed

## 2019-10-24 NOTE — RADIOLOGY REPORT (SQ)
EXAM DESCRIPTION:  CHEST 2 VIEWS



COMPLETED DATE/TIME:  10/24/2019 3:54 pm



REASON FOR STUDY:  chest pain hx clots and kidney  cancer



COMPARISON:  PA and lateral views of the chest from 8/5/2019.



EXAM PARAMETERS:  NUMBER OF VIEWS: two views

TECHNIQUE: Digital Frontal and Lateral radiographic views of the chest acquired.

RADIATION DOSE: NA

LIMITATIONS: none



FINDINGS:  LUNGS AND PLEURA: No consolidation, pleural effusion or pneumothorax.

MEDIASTINUM AND HILAR STRUCTURES: No mediastinal or hilar contour abnormality.

HEART AND VASCULAR STRUCTURES: The cardiac silhouette and pulmonary vasculature are within normal maciel
its.

BONES: No acute findings.

HARDWARE: None.

OTHER: No other finding.



IMPRESSION:  No acute cardiopulmonary process.



TECHNICAL DOCUMENTATION:  JOB ID:  [id number]

 2011 [employer]- All Rights Reserved



Reading location - IP/workstation name: [email or web address]

## 2019-10-24 NOTE — ER DOCUMENT REPORT
ED Medical Screen (RME)





- General


Chief Complaint: Chest Wall Pain


Stated Complaint: SIDE PAIN


Time Seen by Provider: 10/24/19 15:14


Primary Care Provider: 


JANAY WILSON [Primary Care Provider] - Follow up as needed


Mode of Arrival: Ambulatory


Information source: Patient


Notes: 





39-year-old male presented to ED for complaint of chest pain or shortness of 

breath he was sent over here by [facility] due to history of blood clots and 

kidney cancer they requested CTA blood work and chest pain work-up.  He states 

he had the pain for 3 days.  Patient is alert oriented and respirations regular 

and unlabored speaking in full sentences at this time.  Orders have been 

started.











I have greeted and performed a rapid initial assessment of this patient.  A 

comprehensive ED assessment and evaluation of the patient, analysis of test 

results and completion of medical decision making process will be conducted by 

an additional ED providers.


TRAVEL OUTSIDE OF THE [place] IN LAST 30 DAYS: No





- Related Data


Allergies/Adverse Reactions: 


                                        





No Known Allergies Allergy (Verified 10/24/19 15:07)


   











Past Medical History





- Social History


Chew tobacco use (# tins/day): No


Frequency of alcohol use: None


Drug Abuse: None





- Past Medical History


Cardiac Medical History: Reports: Hx Hypertension


Neurological Medical History: Reports: Hx Migraine


Endocrine Medical History: Reports: Hx Diabetes Mellitus Type 2


Renal/ Medical History: Denies: Hx Peritoneal Dialysis


Past Surgical History: Reports: Hx Kidney (Renal Surgery) - micro-disection of 

left kidney





Physical Exam





- Vital signs


Vitals: 





                                        











Temp Pulse Resp BP Pulse Ox


 


 97.8 F   66   18   157/95 H  98 


 


 10/24/19 15:00  10/24/19 15:00  10/24/19 15:00  10/24/19 15:00  10/24/19 15:00














Course





- Vital Signs


Vital signs: 





                                        











Temp Pulse Resp BP Pulse Ox


 


 97.8 F   66   18   157/95 H  98 


 


 10/24/19 15:00  10/24/19 15:00  10/24/19 15:00  10/24/19 15:00  10/24/19 15:00














Doctor's Discharge





- Discharge


Referrals: 


[facility] [Primary Care Provider] - Follow up as needed

## 2019-10-31 ENCOUNTER — HOSPITAL ENCOUNTER (EMERGENCY)
Dept: HOSPITAL 62 - ER | Age: 39
Discharge: HOME | End: 2019-10-31
Payer: OTHER GOVERNMENT

## 2019-10-31 VITALS — SYSTOLIC BLOOD PRESSURE: 139 MMHG | DIASTOLIC BLOOD PRESSURE: 99 MMHG

## 2019-10-31 DIAGNOSIS — M54.5: ICD-10-CM

## 2019-10-31 DIAGNOSIS — Z79.82: ICD-10-CM

## 2019-10-31 DIAGNOSIS — E11.9: ICD-10-CM

## 2019-10-31 DIAGNOSIS — N20.0: Primary | ICD-10-CM

## 2019-10-31 DIAGNOSIS — Z86.711: ICD-10-CM

## 2019-10-31 DIAGNOSIS — E04.1: ICD-10-CM

## 2019-10-31 DIAGNOSIS — I10: ICD-10-CM

## 2019-10-31 LAB
ADD MANUAL DIFF: NO
ALBUMIN SERPL-MCNC: 4.5 G/DL (ref 3.5–5)
ALP SERPL-CCNC: 64 U/L (ref 38–126)
ANION GAP SERPL CALC-SCNC: 9 MMOL/L (ref 5–19)
APPEARANCE UR: CLEAR
APTT PPP: YELLOW S
AST SERPL-CCNC: 38 U/L (ref 17–59)
BASOPHILS # BLD AUTO: 0 10^3/UL (ref 0–0.2)
BASOPHILS NFR BLD AUTO: 0.4 % (ref 0–2)
BILIRUB DIRECT SERPL-MCNC: 0.1 MG/DL (ref 0–0.4)
BILIRUB SERPL-MCNC: 0.4 MG/DL (ref 0.2–1.3)
BILIRUB UR QL STRIP: NEGATIVE
BUN SERPL-MCNC: 15 MG/DL (ref 7–20)
CALCIUM: 9.6 MG/DL (ref 8.4–10.2)
CHLORIDE SERPL-SCNC: 100 MMOL/L (ref 98–107)
CO2 SERPL-SCNC: 31 MMOL/L (ref 22–30)
EOSINOPHIL # BLD AUTO: 0.1 10^3/UL (ref 0–0.6)
EOSINOPHIL NFR BLD AUTO: 1.4 % (ref 0–6)
ERYTHROCYTE [DISTWIDTH] IN BLOOD BY AUTOMATED COUNT: 13.5 % (ref 11.5–14)
GLUCOSE SERPL-MCNC: 120 MG/DL (ref 75–110)
GLUCOSE UR STRIP-MCNC: NEGATIVE MG/DL
HCT VFR BLD CALC: 46.8 % (ref 37.9–51)
HGB BLD-MCNC: 16.1 G/DL (ref 13.5–17)
KETONES UR STRIP-MCNC: NEGATIVE MG/DL
LYMPHOCYTES # BLD AUTO: 2.8 10^3/UL (ref 0.5–4.7)
LYMPHOCYTES NFR BLD AUTO: 48.8 % (ref 13–45)
MCH RBC QN AUTO: 30.4 PG (ref 27–33.4)
MCHC RBC AUTO-ENTMCNC: 34.3 G/DL (ref 32–36)
MCV RBC AUTO: 89 FL (ref 80–97)
MONOCYTES # BLD AUTO: 0.4 10^3/UL (ref 0.1–1.4)
MONOCYTES NFR BLD AUTO: 6.8 % (ref 3–13)
NEUTROPHILS # BLD AUTO: 2.5 10^3/UL (ref 1.7–8.2)
NEUTS SEG NFR BLD AUTO: 42.6 % (ref 42–78)
NITRITE UR QL STRIP: NEGATIVE
PH UR STRIP: 5 [PH] (ref 5–9)
PLATELET # BLD: 172 10^3/UL (ref 150–450)
POTASSIUM SERPL-SCNC: 3.9 MMOL/L (ref 3.6–5)
PROT SERPL-MCNC: 7.8 G/DL (ref 6.3–8.2)
PROT UR STRIP-MCNC: NEGATIVE MG/DL
RBC # BLD AUTO: 5.29 10^6/UL (ref 4.35–5.55)
SP GR UR STRIP: 1.03
TOTAL CELLS COUNTED % (AUTO): 100 %
UROBILINOGEN UR-MCNC: 2 MG/DL (ref ?–2)
WBC # BLD AUTO: 5.8 10^3/UL (ref 4–10.5)

## 2019-10-31 PROCEDURE — 81001 URINALYSIS AUTO W/SCOPE: CPT

## 2019-10-31 PROCEDURE — 96372 THER/PROPH/DIAG INJ SC/IM: CPT

## 2019-10-31 PROCEDURE — 76536 US EXAM OF HEAD AND NECK: CPT

## 2019-10-31 PROCEDURE — 76770 US EXAM ABDO BACK WALL COMP: CPT

## 2019-10-31 PROCEDURE — 36415 COLL VENOUS BLD VENIPUNCTURE: CPT

## 2019-10-31 PROCEDURE — 85025 COMPLETE CBC W/AUTO DIFF WBC: CPT

## 2019-10-31 PROCEDURE — 84443 ASSAY THYROID STIM HORMONE: CPT

## 2019-10-31 PROCEDURE — 99284 EMERGENCY DEPT VISIT MOD MDM: CPT

## 2019-10-31 PROCEDURE — 80053 COMPREHEN METABOLIC PANEL: CPT

## 2019-10-31 NOTE — ER DOCUMENT REPORT
ED Medical Screen (RME)





- General


Chief Complaint: Back Pain


Stated Complaint: LOWER BACK PAIN


Time Seen by Provider: 10/31/19 11:02


Primary Care Provider: 


JANAY WILSON [Primary Care Provider] - Follow up as needed


Notes: 





Patient is a 39-year-old male with a history of pulmonary embolism and a right 

kidney tumor who presents to the emergency department with a chief complaint of 

the left "side" pain.  Patient reports he was seen here on October 24 for the 

same.  Patient reports at that time he was having some shortness of breath.  

Patient reports his testing was negative.  Patient reports he was given Toradol 

and a muscle relaxer which he has been using but his pain is gotten worse.  

Patient reports he is having left side pain that radiates into the left flank.  

Patient reports he does have a history of a kidney tumor on the right side that 

was removed years ago with surgery.  Patient reports he is having urinary 

frequency.  Patient denies fever or abdominal pain.  Patient denies injury, 

recent heavy lifting or fall.  Patient states he is not currently on blood 

thinners but does take a baby aspirin daily.


TRAVEL OUTSIDE OF THE [place] IN LAST 30 DAYS: No





- Related Data


Allergies/Adverse Reactions: 


                                        





No Known Allergies Allergy (Verified 10/31/19 09:50)


   











Past Medical History





- Social History


Chew tobacco use (# tins/day): No


Frequency of alcohol use: Occasional


Drug Abuse: None





- Past Medical History


Cardiac Medical History: Reports: Hx Hypertension


Neurological Medical History: Reports: Hx Migraine


Endocrine Medical History: Reports: Hx Diabetes Mellitus Type 2


Renal/ Medical History: Denies: Hx Peritoneal Dialysis


Past Surgical History: Reports: Hx Kidney (Renal Surgery) - micro-disection of 

left kidney





Physical Exam





- Vital signs


Vitals: 





                                        











Temp Pulse Resp BP Pulse Ox


 


 98.5 F   71   16   126/88 H  97 


 


 10/31/19 09:43  10/31/19 09:43  10/31/19 09:43  10/31/19 09:43  10/31/19 09:43














Course





- Re-evaluation


Re-evalutation: 





10/31/19 11:16


I have greeted and performed a rapid initial assessment of this patient.  A 

comprehensive ED assessment and evaluation of the patient, analysis of test 

results and completion of the medical decision making process will be conducted 

by additional ED providers.





- Vital Signs


Vital signs: 





                                        











Temp Pulse Resp BP Pulse Ox


 


 98.5 F   71   16   126/88 H  97 


 


 10/31/19 09:43  10/31/19 09:43  10/31/19 09:43  10/31/19 09:43  10/31/19 09:43














- Laboratory


Laboratory results interpreted by me: 





                                        











  10/31/19





  10:02


 


Urine Urobilinogen  2.0 H


 


Urine Ascorbic Acid  40 H














Doctor's Discharge





- Discharge


Referrals: 


[facility] [Primary Care Provider] - Follow up as needed

## 2019-10-31 NOTE — ER DOCUMENT REPORT
ED General





- General


Chief Complaint: Back Pain


Stated Complaint: LOWER BACK PAIN


Time Seen by Provider: 10/31/19 11:02


Primary Care Provider: 


RAMU SOLIS MD [[facility]] - Follow up as needed


[facility] [Primary Care Provider] - Follow up as needed


Mode of Arrival: Ambulatory


Information source: Patient


TRAVEL OUTSIDE OF THE [place] IN LAST 30 DAYS: No





- HPI


Notes: 





 39-year-old male with a history of pulmonary embolism and a right kidney tumor 

in 2016 who presents to the emergency department with a chief complaint of the 

left "side" pain.  Patient reports he was seen here on October 24th 2019 for the

same complaint but there was a concern for PE, workup was negative. .  Patient 

reports he was given Toradol and a muscle relaxer which he has been using but 

his pain is gotten worse.  Patient reports he is having left side pain that 

radiates into the left flank.  Patient reports he does have a history of a 

kidney tumor on the right side that was removed years ago with surgery.  Patient

reports he is having urinary frequency.  Patient denies fever or abdominal pain.

 Patient denies injury, recent heavy lifting or fall.  Patient states he is not 

currently on blood thinners but does take a baby aspirin daily.Denies fevers, 

chills,  chest pain,palpitations,  shortness of breath, dyspnea,  vomiting, 

diarrhea, abdominal pain, hematuria,blurred vision, double vision, loss of 

vision, speech changes, LH, dizziness, syncope, headaches, wheezing, ST, URI, 

neck pain, weakness, bowel or bladder dysfunction, saddle anesthesia, numbness 

or tingling in bilateral upper or lower extremities equally, muscle paralysis, w

eakness in bilateral upper or lower extremities equally or rash.





- Related Data


Allergies/Adverse Reactions: 


                                        





No Known Allergies Allergy (Verified 10/31/19 09:50)


   











Past Medical History





- General


Information source: Patient, Relative





- Social History


Smoking Status: Never Smoker


Chew tobacco use (# tins/day): No


Frequency of alcohol use: Occasional


Drug Abuse: None


Family History: Reviewed & Not Pertinent


Patient has suicidal ideation: No


Patient has homicidal ideation: No





- Past Medical History


Cardiac Medical History: Reports: Hx Hypertension


Neurological Medical History: Reports: Hx Migraine


Endocrine Medical History: Reports: Hx Diabetes Mellitus Type 2


Renal/ Medical History: Denies: Hx Peritoneal Dialysis


Past Surgical History: Reports: Hx Kidney (Renal Surgery) - micro-disection of 

left kidney





Review of Systems





- Review of Systems


Constitutional: No symptoms reported


EENT: No symptoms reported


Cardiovascular: No symptoms reported


Respiratory: No symptoms reported


Gastrointestinal: See HPI


Genitourinary: No symptoms reported


Male Genitourinary: No symptoms reported


Musculoskeletal: No symptoms reported


Skin: No symptoms reported


Hematologic/Lymphatic: No symptoms reported


Neurological/Psychological: No symptoms reported





Physical Exam





- Vital signs


Vitals: 


                                        











Temp Pulse Resp BP Pulse Ox


 


 98.5 F   71   16   126/88 H  97 


 


 10/31/19 09:43  10/31/19 09:43  10/31/19 09:43  10/31/19 09:43  10/31/19 09:43














- Notes


Notes: 





PHYSICAL EXAMINATION:reviewed vital signs by RN





GENERAL: Well-appearing, well-nourished and in no acute distress.





HEAD: Atraumatic, normocephalic.





EYES: Pupils equal round and reactive to light, extraocular movements intact, 

sclera anicteric, conjunctiva are normal.





ENT: Nares patent, oropharynx clear without exudates.  Moist mucous membranes.





NECK: Normal range of motion, supple without lymphadenopathy





LUNGS: Breath sounds clear to auscultation bilaterally and equal.  No wheezes 

rales or rhonchi.





HEART: Regular rate and rhythm without murmurs





ABDOMEN: Soft, , nondistended abdomen.  No guarding, no rebound.  No masses 

appreciated.  Left flank pain, left upper quadrant pain 





Musculoskeletal: Normal range of motion, no pitting or edema.  No cyanosis.





NEUROLOGICAL: Cranial nerves grossly intact.  Normal speech, normal gait.  

Normal sensory, motor exams 





PSYCH: Normal mood, normal affect.





SKIN: Warm, Dry, normal turgor, no rashes or lesions noted.





Course





- Re-evaluation


Re-evalutation: 





10/31/19 14:03


Afebrile, vitals stable, in mild distress.  Nurse's notes reviewed.  Renal 

ultrasound shows a 6 mm nonobstructive stone in left kidney.  CBC negative for 

leukocytosis or anemia, CMP negative for hepatic or renal dysfunction, thyroid 

is 1.94.  Patient was concerned about having thyroid mass, and previous CTA, did

note date enlargement of his thyroid, ultrasound of his thyroid showed multiple 

right-sided thyroid nodules.  The largest measures 3.9 x 2.6 x 3.1 cm.  This 

corresponds to a TR for lesion and a fine-needle aspirate is recommended.  The 

other 2 nodules corresponding to T3 lesions.  Based on size continued 

surveillance is recommended.   CMP negative for hepatic or renal dysfunction, no

electrolyte disturbances. patient already has an established urologist in 

[place] due to having a right renal tumor resection in 2016, he does see him 

annually.  A advised to follow-up with urologist regarding 6 mm nonobstructing 

renal stone.  Will start patient on Flomax, ciprofloxacin, hydrocodone and 

Zofran for management of renal stone.  Advised to increase oral hydration.  

After performing a Medical Screening Examination, I estimate there is LOW risk 

for ACUTE APPENDICITIS, BOWEL OBSTRUCTION, ACUTE CHOLECYSTITIS, PERFORATED 

DIVERTICULITIS, INCARCERATED HERNIA, PANCREATITIS, TESTICULAR TORSION or 

PERFORATED ULCER, thus I consider the discharge disposition reasonable. Also, 

there is no evidence or peritonitis, sepsis, or toxicity.  I have reevaluated 

this patient multiple times and no significant life threatening changes are 

noted. The patient and I have discussed the diagnosis and risks, and we agree 

with discharging home with close follow-up with the understanding that symptoms 

and presentations can change. We also discussed returning to the Emergency 

Department immediately if new or worsening symptoms occur. We have discussed the

symptoms which are most concerning (e.g., bloody stool, fever, changing or 

worsening pain, intractable vomiting - standard verbal up date) that necessitate

immediate return.





- Vital Signs


Vital signs: 


                                        











Temp Pulse Resp BP Pulse Ox


 


 98.5 F   71   16   126/88 H  97 


 


 10/31/19 09:43  10/31/19 09:43  10/31/19 09:43  10/31/19 09:43  10/31/19 09:43














- Laboratory


Result Diagrams: 


                                 10/31/19 11:39





                                 10/31/19 11:39


Laboratory results interpreted by me: 


                                        











  10/31/19 10/31/19 10/31/19





  10:02 11:39 11:39


 


Lymph % (Auto)   48.8 H 


 


Carbon Dioxide    31 H


 


Glucose    120 H


 


Urine Urobilinogen  2.0 H  


 


Urine Ascorbic Acid  40 H  














Discharge





- Discharge


Clinical Impression: 


 Left renal stone, Thyroid nodule





Condition: Stable


Disposition: HOME, SELF-CARE


Instructions:  Kidney Stone ([facility]), Pain Medication Injection ([facility]), Low Back 

Pain ([facility]), Oral Narcotic Medication ([facility])


Additional Instructions: 


Do not drive, drink or operate heavy machinery while taking medication cause 

sedation or impairment of cognitive function.  Please take stool softeners to 

prevent constipation while taking hydrocodone's.  Take medication as directed.  

If you experience any worsening symptoms, fever, weakness, severe pain.  Please 

follow-up with urologist that you have Tom established with for further 

evaluation and management of your renal stone.





Please follow-up with your primary care provider for further evaluation of 

thyroid for fine-needle aspiration biopsy your thyroid is normal. 








Return immediately for any new or worsening symptoms.





Follow up with primary care provider, call tomorrow to make followup 

appointment.


Prescriptions: 


Hydrocodone/Acetaminophen [Norco 5-325 Tablet] 1 each PO Q6HP PRN #12 tablet


 PRN Reason: 


Ciprofloxacin HCl [Cipro 500 mg Tablet] 500 mg PO BID #20 tablet


Tamsulosin HCl [Flomax 0.4 mg Cap.sr] 0.4 mg PO DAILY #7 cap.sr.24h


Ondansetron [Zofran Odt 4 mg Tablet] 1 - 2 tab PO Q4H PRN #15 tab.rapdis


 PRN Reason: For Nausea/Vomiting


Referrals: 


[facility] [[facility] Care [facility]] - Follow up as needed


RAMU SOLIS MD [[facility]] - Follow up as needed

## 2019-10-31 NOTE — RADIOLOGY REPORT (SQ)
EXAM DESCRIPTION:  U/S RETROPERITON (RENAL/AORTA)



COMPLETED DATE/TIME:  10/31/2019 12:41 pm



REASON FOR STUDY:  left flank pain, urinary frequency



COMPARISON:  None.



TECHNIQUE:  Dynamic and static grayscale images acquired of the kidneys and bladder and recorded on P
ACS. Additional selected color Doppler and spectral images recorded.



LIMITATIONS:  None.



FINDINGS:  RIGHT KIDNEY: Normal size. Normal echogenicity. No solid or suspicious masses. No hydronep
hrosis.  Upper pole nonobstructing stone measuring 6 mm.

LEFT KIDNEY:  Normal size. Normal echogenicity. No solid or suspicious masses.  Lower pole cyst measu
ring 1.2 cm.  No hydronephrosis. No calcifications.

BLADDER: No masses.

OTHER FINDINGS: No other significant finding.



IMPRESSION:  No hydronephrosis.

Nonobstructing right upper pole 6 mm stone.



TECHNICAL DOCUMENTATION:  JOB ID:  [id number]

 2011 [employer]- All Rights Reserved



Reading location - IP/workstation name: [email or web address]

## 2019-12-07 ENCOUNTER — HOSPITAL ENCOUNTER (EMERGENCY)
Dept: HOSPITAL 62 - ER | Age: 39
Discharge: HOME | End: 2019-12-07
Payer: OTHER GOVERNMENT

## 2019-12-07 VITALS — SYSTOLIC BLOOD PRESSURE: 133 MMHG | DIASTOLIC BLOOD PRESSURE: 90 MMHG

## 2019-12-07 DIAGNOSIS — I10: ICD-10-CM

## 2019-12-07 DIAGNOSIS — N50.812: ICD-10-CM

## 2019-12-07 DIAGNOSIS — Z85.53: ICD-10-CM

## 2019-12-07 DIAGNOSIS — Z98.890: ICD-10-CM

## 2019-12-07 DIAGNOSIS — N45.1: Primary | ICD-10-CM

## 2019-12-07 DIAGNOSIS — N50.89: ICD-10-CM

## 2019-12-07 DIAGNOSIS — E11.9: ICD-10-CM

## 2019-12-07 LAB
APPEARANCE UR: CLEAR
APTT PPP: YELLOW S
BILIRUB UR QL STRIP: NEGATIVE
CHLAM PCR: NOT DETECTED
GLUCOSE UR STRIP-MCNC: NEGATIVE MG/DL
KETONES UR STRIP-MCNC: NEGATIVE MG/DL
NITRITE UR QL STRIP: NEGATIVE
PH UR STRIP: 5 [PH] (ref 5–9)
PROT UR STRIP-MCNC: NEGATIVE MG/DL
SP GR UR STRIP: 1.02
UROBILINOGEN UR-MCNC: NEGATIVE MG/DL (ref ?–2)

## 2019-12-07 PROCEDURE — 87591 N.GONORRHOEAE DNA AMP PROB: CPT

## 2019-12-07 PROCEDURE — 93976 VASCULAR STUDY: CPT

## 2019-12-07 PROCEDURE — 81001 URINALYSIS AUTO W/SCOPE: CPT

## 2019-12-07 PROCEDURE — 87491 CHLMYD TRACH DNA AMP PROBE: CPT

## 2019-12-07 PROCEDURE — 99284 EMERGENCY DEPT VISIT MOD MDM: CPT

## 2019-12-07 PROCEDURE — 76870 US EXAM SCROTUM: CPT

## 2019-12-07 NOTE — RADIOLOGY REPORT (SQ)
EXAM DESCRIPTION:  U/S SCROTUM W/DOPPLER



COMPLETED DATE/TIME:  12/7/2019 4:35 pm



REASON FOR STUDY:  reports left testicular pain and swelling x 3 days



COMPARISON:  None.



TECHNIQUE:  Static and realtime gray scale imaging of the scrotum and testes.  Selected color Doppler
 and spectral images recorded to document blood flow.



LIMITATIONS:  None.



FINDINGS:  RIGHT:

TESTICLE: Normal size. Normal echotexture.  Normal blood flow.  No mass.

EPIDIDYMIS: Normal.

HYDROCELE OR VARICOCELE: Small varicocele.  No hydrocele.

HERNIA OR EXTRA-TESTICULAR MASS: No.

OTHER: No other significant finding.

LEFT:

TESTICLE: Normal size. Normal echotexture.  Normal blood flow.  No mass.

EPIDIDYMIS: Slightly enlarged heterogeneous left epididymis.

HYDROCELE OR VARICOCELE: No varicocele.  Small hydrocele.

HERNIA OR EXTRA-TESTICULAR MASS: No.

OTHER: No other significant finding.



IMPRESSION:  Slightly enlarged heterogeneous left epididymis.. NO EVIDENCE OF TESTICULAR MASS OR TORS
ION.



TECHNICAL DOCUMENTATION:  JOB ID:  [id number]

[id number]

 2011 [employer]- All Rights Reserved



Reading location - IP/workstation name: [email or web address]

## 2019-12-07 NOTE — ER DOCUMENT REPORT
ED Medical Screen (RME)





- General


Chief Complaint: Testicular Swelling


Stated Complaint: TESTICULAR SWELLING


Time Seen by Provider: 12/07/19 15:38


Primary Care Provider: 


[facility] [[facility] Care Provider] - Follow up as needed


Notes: 





Patient is a 39-year-old male with a history hypertension, type 2 diabetes, 

stage III kidney cancer with partial removal of the left kidney, pulmonary 

embolism presents to the emergency department with a chief complaint of left 

testicular swelling.  Patient reports this has been present for 3 days with 

pain.  He reports the pain is worse with movement.  Patient states he is not any

concern for sexually transmitted diseases.  Patient states he has not had this 

happen before.  Patient denies injury or trauma.  Patient denies urinary 

symptoms.





Patient reports he did have a pulmonary embolism 3 years ago after having his 

partial left kidney removed.  Patient reports he is not currently on blood 

thinners and was cleared by his physician.


TRAVEL OUTSIDE OF THE [place] IN LAST 30 DAYS: No





- Related Data


Allergies/Adverse Reactions: 


                                        





No Known Allergies Allergy (Verified 12/07/19 15:35)


   











Past Medical History





- Social History


Chew tobacco use (# tins/day): No


Frequency of alcohol use: None


Drug Abuse: None





- Past Medical History


Cardiac Medical History: Reports: Hx Hypertension


Neurological Medical History: Reports: Hx Migraine


Endocrine Medical History: Reports: Hx Diabetes Mellitus Type 2


Renal/ Medical History: Denies: Hx Peritoneal Dialysis


Past Surgical History: Reports: Hx Kidney (Renal Surgery) - micro-disection of 

left kidney





Physical Exam





- Vital signs


Vitals: 





                                        











Temp Pulse BP Pulse Ox


 


 98.3 F   66   136/90 H  99 


 


 12/07/19 15:27  12/07/19 15:27  12/07/19 15:27  12/07/19 15:27














Course





- Re-evaluation


Re-evalutation: 





12/07/19 15:44


Patient will require ultrasound.  He states his wife is driving, will give a 

dose of pain medication prior to ultrasound.  Will obtain a urinalysis.





I have greeted and performed a rapid initial assessment of this patient.  A 

comprehensive ED assessment and evaluation of the patient, analysis of test 

results and completion of the medical decision making process will be conducted 

by additional ED providers.





- Vital Signs


Vital signs: 





                                        











Temp Pulse Resp BP Pulse Ox


 


 98.3 F   66      136/90 H  99 


 


 12/07/19 15:35  12/07/19 15:27     12/07/19 15:27  12/07/19 15:35














Doctor's Discharge





- Discharge


Referrals: 


[facility] [Primary Care Provider] - Follow up as needed

## 2019-12-07 NOTE — ER DOCUMENT REPORT
ED General





- General


Chief Complaint: Testicular Swelling


Stated Complaint: TESTICULAR SWELLING


Time Seen by Provider: 12/07/19 15:38


Primary Care Provider: 


[facility] [[facility] Care Provider] - Follow up as needed


TRAVEL OUTSIDE OF THE [place] IN LAST 30 DAYS: No





- HPI


Notes: 





Patient is a 39-year-old male who presents complaining of left testicular 

swelling and pain for the past 3 days that is been constant.  Pain does not 

radiate.  Patient has been able to eat and drink without difficulty.  He is 

urinating normally and having normal bowel movements.  He has not had this pain 

before.  He does have a medical history of hypertension, diabetes type 2, and 

left renal cancer status post partial nephrectomy and has been in remission for 

3 years.  He has no concern of STD or STI.  Denies drug allergies.  Denies any 

headache, fever, neck pain, URI, sore throat, chest pain, palpitations, syncope,

cough, shortness of breath, wheeze, dyspnea, abdominal pain, 

nausea/vomiting/diarrhea, urinary retention, dysuria, hematuria, back pain, or 

rash.





- Related Data


Allergies/Adverse Reactions: 


                                        





No Known Allergies Allergy (Verified 12/07/19 15:35)


   











Past Medical History





- Social History


Smoking Status: Never Smoker


Chew tobacco use (# tins/day): No


Frequency of alcohol use: None


Drug Abuse: None


Family History: Reviewed & Not Pertinent


Patient has suicidal ideation: No


Patient has homicidal ideation: No





- Past Medical History


Cardiac Medical History: Reports: Hx Hypertension


Neurological Medical History: Reports: Hx Migraine


Endocrine Medical History: Reports: Hx Diabetes Mellitus Type 2


Renal/ Medical History: Denies: Hx Peritoneal Dialysis


Past Surgical History: Reports: Hx Kidney (Renal Surgery) - micro-disection of 

left kidney





Review of Systems





- Review of Systems


-: Yes All other systems reviewed and negative





Physical Exam





- Vital signs


Vitals: 


                                        











Temp Pulse BP Pulse Ox


 


 98.3 F   66   136/90 H  99 


 


 12/07/19 15:27  12/07/19 15:27  12/07/19 15:27  12/07/19 15:27














- Notes


Notes: 





PHYSICAL EXAMINATION:





GENERAL: Well-appearing, well-nourished and in no acute distress.





HEAD: Atraumatic, normocephalic.





EYES: Pupils equal round and reactive to light, extraocular movements intact, sc

devora anicteric, conjunctiva are normal.





ENT:  Nares patent and without discharge.  oropharynx clear without exudates.  

No tonsilar hypertrophy or erythema.  Moist mucous membranes.  





NECK: Normal range of motion, supple without lymphadenopathy





LUNGS: Breath sounds clear to auscultation bilaterally and equal.  No wheezes 

rales or rhonchi.





HEART: Regular rate and rhythm without murmurs, rubs, gallops.





ABDOMEN: Soft, nontender, nondistended abdomen.  No guarding, no rebound.  

Normal bowel sounds present.  No CVA tenderness bilaterally.





: + mild swelling noted left testicle with tenderness associated to that area.

 There is no erythema, ecchymosis, rash, necrosis noted.  There is no urethral 

discharge or obvious inguinal hernia.  Nontender to palpation of the inguinal 

area or penis.  No obvious transverse testicular lie.  Cremasterics intact 

bilaterally.





Musculoskeletal: FROM to passive/active. Strength 5+/5. 





Extremities:  No cyanosis, clubbing, or edema b/l.  Peripheral pulses 2+.  

Capillary refill less than 3 seconds.





NEUROLOGICAL:  Normal speech, normal gait.  





PSYCH: Normal mood, normal affect.





SKIN: Warm, Dry, normal turgor, no rashes or lesions noted.





Course





- Re-evaluation


Re-evalutation: 





12/07/19 18:33


Patient is an afebrile, well-hydrated, 39-year-old male who presents with left 

epididymitis, suspect inflammatory as primary but infectious still in 

differential.  Vitals are acceptable without significant tachycardia, tachypnea,

or hypoxia.  Patient is nontoxic-appearing and is tolerating p.o. without 

difficulty.  Labs are acceptable at this time.  Chlamydia gonorrhea tests are 

pending, but patient elected not to have any medicines at this time.  He is 

aware that he may need to return with any positive testing.  Ultrasound does 

show epididymitis without evidence of torsion.  No further work-up warranted.  

Low suspicion/risk for acute appendicitis, bowel obstruction, acute cholecysti

tis, perforated diverticulitis, incarcerated hernia, pancreatitis, perforated 

ulcer, peritonitis, sepsis, testicular torsion, gangrene, or other systemic 

emergent condition at this time.  Patient is aware that his condition can change

from initial presentation and he needs to monitor symptoms closely and seek 

medical attention if any acute changes.  Conservative measures otherwise for 

symptoms.  Recheck with PCM in 2-3 days.  Schedule consult with Urology.  Return

to the ED with any worsening/concerning symptoms otherwise as reviewed in 

discharge.  Patient is in agreement.





- Vital Signs


Vital signs: 


                                        











Temp Pulse Resp BP Pulse Ox


 


 98.3 F   66      136/90 H  99 


 


 12/07/19 15:35  12/07/19 15:27     12/07/19 15:27  12/07/19 15:35














- Laboratory


Laboratory results interpreted by me: 


                                        











  12/07/19





  15:45


 


Urine Blood  SMALL H














Discharge





- Discharge


Clinical Impression: 


 Left epididymitis





Condition: Stable


Disposition: HOME, SELF-CARE


Instructions:  Epididymitis ([facility]), Doxycycline ([facility])


Additional Instructions: 


Supportive underwear


Push fluids (i.e. water, cranberry juice)


Proper hygenic technique


Keep the skin clean


Tylenol/ibuprofen as needed


Take medications as directed


F/u with your PCM in 3-5 days for a recheck


Schedule consult with a Urologist.


Return to the ED with any worsening symptoms and/or development of fever, 

headache, chest pain, palpitations, syncope, shortness of breath, trouble 

breathing, abdominal pain, n/v/d, blood in stool/urine, loss of control of 

bowel/bladder, urinary retention, or other worsening symptoms that are 

concerning to you.


Prescriptions: 


Doxycycline Hyclate 100 mg PO BID #20 capsule


Ibuprofen [Motrin 800 mg Tablet] 800 mg PO Q8H PRN #15 tab


 PRN Reason: 


Forms:  Elevated Blood Pressure


Referrals: 


[facility] [[facility]] - Follow up as needed


[facility] [Provider Group] - Follow up in 3-5 days

## 2020-01-24 ENCOUNTER — HOSPITAL ENCOUNTER (EMERGENCY)
Dept: HOSPITAL 62 - ER | Age: 40
Discharge: HOME | End: 2020-01-24
Payer: OTHER GOVERNMENT

## 2020-01-24 VITALS — DIASTOLIC BLOOD PRESSURE: 90 MMHG | SYSTOLIC BLOOD PRESSURE: 147 MMHG

## 2020-01-24 DIAGNOSIS — E11.9: ICD-10-CM

## 2020-01-24 DIAGNOSIS — H57.12: ICD-10-CM
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
## 2020-01-24 DIAGNOSIS — S05.02XA: Primary | ICD-10-CM

## 2020-01-24 DIAGNOSIS — I10: ICD-10-CM

## 2020-01-24 PROCEDURE — 99283 EMERGENCY DEPT VISIT LOW MDM: CPT

## 2020-01-24 NOTE — ER DOCUMENT REPORT
HPI





- HPI


Patient complains to provider of: left eye irritation


Time Seen by Provider: 01/24/20 15:46


Onset: Other - 3 weeks


Onset/Duration: Persistent


Pain Level: 4


Context: 





39-year-old male presents emergency department with complaints of left eye 

irritation.  Reports he has had this irritation for the past 3 weeks.  He [facility] 

[facility] and [facility].  He has been treated with 

antibiotic drops and ointment.  He reports he still using eye ointment after 2 

weeks.  He does not remember what the name of the ointment was.  He reports 

optometrist said there was nothing wrong with his eye.  Patient complains of 

clear drainage with eye irritation.  Denies problems with his vision.  Denies 

trauma.  Patient wears glasses does not wear contacts.  Denies other symptoms [place]

ch as fever vomiting diarrhea


Associated Symptoms: None


Exacerbated by: Denies


Relieved by: Denies


Similar symptoms previously: Yes


Recently seen / treated by doctor: Yes





- EENT


EENT: REPORTS: Eye problems





- REPRODUCTIVE


Reproductive: DENIES: Pregnant:





Past Medical History





- General


Information source: Patient





- Social History


Smoking Status: Never Smoker


Chew tobacco use (# tins/day): No


Frequency of alcohol use: None


Drug Abuse: None


Family History: Reviewed & Not Pertinent


Patient has suicidal ideation: No


Patient has homicidal ideation: No





- Past Medical History


Cardiac Medical History: Reports: Hx Hypertension


Neurological Medical History: Reports: Hx Migraine


Endocrine Medical History: Reports: Hx Diabetes Mellitus Type 2


Renal/ Medical History: Denies: Hx Peritoneal Dialysis


Malignancy Medical History: Reports Hx Renal (Kidney) Cancer


Psychiatric Medical History: Reports: Hx Post Traumatic Stress Disorder


Past Surgical History: Reports: Hx Kidney (Renal Surgery) - micro-disection of 

left kidney





Vertical Provider Document





- CONSTITUTIONAL


Agree With Documented VS: Yes


Exam Limitations: No Limitations


General Appearance: WD/WN, No Apparent Distress





- INFECTION CONTROL


TRAVEL OUTSIDE OF THE [place] IN LAST 30 DAYS: No





- HEENT


HEENT: Atraumatic, Conjuctival Injection - left eye, Normocephalic, PERRLA





- NECK


Neck: Normal Inspection, Supple





- RESPIRATORY


Respiratory: Breath Sounds Normal, No Respiratory Distress





- CARDIOVASCULAR


Cardiovascular: Regular Rate, Regular Rhythm





- MUSCULOSKELETAL/EXTREMETIES


Musculoskeletal/Extremeties: MAEW, FROM, Non-Tender





- NEURO


Level of Consciousness: Awake, Alert, Appropriate


Motor/Sensory: No Motor Deficit





- DERM


Integumentary: Warm, Dry





Course





- Re-evaluation


Re-evalutation: 





01/24/20 17:04


Patient presents emergency department with left eye irritation.  Reports he has 

[facility] and [facility] in the [facility] twice for symptoms.  He 

reports he was just seen at [facility] on the 21st but was treated for 

something else.  He reports he forgot to mention his left eye.  He reports he 

has been taking some time but ointment that they gave him for the past 2 weeks 

and is not relieved above the symptoms.  He reports optometrist told him there 

was nothing wrong with his eye.  Corneal abrasion noted.  Patient was instructed

on the importance of monitoring the site and the importance of follow-up with 

ophthalmologist for recheck within the next week.  He was treated with Polytrim 

while here because he is still using the ointment prescribed by [facility] 2 

weeks ago.  He was instructed to quit the ointment and start the Polytrim.  He 

was also instructed on the importance of follow-up with ophthalmologist for 

recheck and return here for any worsening symptoms, change in vision, concerns 

he verbalized understanding to all instructions





- Vital Signs


Vital signs: 


                                        











Temp Pulse Resp BP Pulse Ox


 


 97.9 F   75   16   147/90 H  95 


 


 01/24/20 15:27  01/24/20 15:27  01/24/20 15:27  01/24/20 15:27  01/24/20 15:27














Procedures





- Eye Procedure


  ** Left


Time completed: 16:21


Eye Irrigated w/ Saline (ccs): 20


Alcaine Drops Administered: Yes - tetracaine


Fluorescein applied: Left


Slit lamp used: No


Notes: 





01/24/20 16:21


woods lamp utilized, + corneal abrasion 


Eyes picture: 


                            __________________________














                            __________________________





 1 - corneal abrasion noted








Discharge





- Discharge


Clinical Impression: 


 Irritation of left eye





Corneal abrasion


Qualifiers:


 Encounter type: initial encounter Laterality: left Qualified Code(s): S05.02XA 

- Injury of conjunctiva and corneal abrasion without foreign body, left eye, 

initial encounter





Condition: Stable


Disposition: HOME, SELF-CARE


Instructions:  Corneal Abrasion ([facility])


Additional Instructions: 


*You have been evaluated for eye irritation, corneal abrasion


*Use eye drops as prescribed- polytrim- 1 drop, four times per day for  five 

days


*Good hand washing- Do not reuse wash clothes or towels after wiping eyes


*[facility] to an [facility] within 1 week




*Return to ED for worsening condition, changes, needs, eye pain, redness 

swelling, decreased vision, concerns


Referrals: 


[facility] [Primary Care Provider] - Follow up in 3-5 days

## 2020-04-23 ENCOUNTER — HOSPITAL ENCOUNTER (EMERGENCY)
Dept: HOSPITAL 62 - ER | Age: 40
Discharge: HOME | End: 2020-04-23
Payer: OTHER GOVERNMENT

## 2020-04-23 VITALS — DIASTOLIC BLOOD PRESSURE: 95 MMHG | SYSTOLIC BLOOD PRESSURE: 141 MMHG

## 2020-04-23 DIAGNOSIS — E11.9: ICD-10-CM

## 2020-04-23 DIAGNOSIS — K92.0: ICD-10-CM

## 2020-04-23 DIAGNOSIS — R06.02: ICD-10-CM

## 2020-04-23 DIAGNOSIS — R07.89: ICD-10-CM

## 2020-04-23 DIAGNOSIS — B96.81: Primary | ICD-10-CM

## 2020-04-23 DIAGNOSIS — K25.9: ICD-10-CM

## 2020-04-23 DIAGNOSIS — R53.83: ICD-10-CM

## 2020-04-23 DIAGNOSIS — I10: ICD-10-CM

## 2020-04-23 LAB
ADD MANUAL DIFF: NO
ALBUMIN SERPL-MCNC: 4.4 G/DL (ref 3.5–5)
ALP SERPL-CCNC: 65 U/L (ref 38–126)
ANION GAP SERPL CALC-SCNC: 11 MMOL/L (ref 5–19)
APPEARANCE UR: CLEAR
APTT PPP: (no result) S
AST SERPL-CCNC: 32 U/L (ref 17–59)
BARBITURATES UR QL SCN: NEGATIVE
BASOPHILS # BLD AUTO: 0 10^3/UL (ref 0–0.2)
BASOPHILS NFR BLD AUTO: 0.5 % (ref 0–2)
BILIRUB DIRECT SERPL-MCNC: 0.1 MG/DL (ref 0–0.4)
BILIRUB SERPL-MCNC: 0.5 MG/DL (ref 0.2–1.3)
BILIRUB UR QL STRIP: NEGATIVE
BUN SERPL-MCNC: 14 MG/DL (ref 7–20)
CALCIUM: 9.3 MG/DL (ref 8.4–10.2)
CHLORIDE SERPL-SCNC: 103 MMOL/L (ref 98–107)
CO2 SERPL-SCNC: 23 MMOL/L (ref 22–30)
EOSINOPHIL # BLD AUTO: 0.1 10^3/UL (ref 0–0.6)
EOSINOPHIL NFR BLD AUTO: 1.7 % (ref 0–6)
ERYTHROCYTE [DISTWIDTH] IN BLOOD BY AUTOMATED COUNT: 13.7 % (ref 11.5–14)
ETHANOL SERPL-MCNC: < 10 MG/DL
GLUCOSE SERPL-MCNC: 182 MG/DL (ref 75–110)
GLUCOSE UR STRIP-MCNC: NEGATIVE MG/DL
HCT VFR BLD CALC: 44.6 % (ref 37.9–51)
HGB BLD-MCNC: 15.7 G/DL (ref 13.5–17)
KETONES UR STRIP-MCNC: NEGATIVE MG/DL
LYMPHOCYTES # BLD AUTO: 2.2 10^3/UL (ref 0.5–4.7)
LYMPHOCYTES NFR BLD AUTO: 38.8 % (ref 13–45)
MCH RBC QN AUTO: 31 PG (ref 27–33.4)
MCHC RBC AUTO-ENTMCNC: 35.2 G/DL (ref 32–36)
MCV RBC AUTO: 88 FL (ref 80–97)
METHADONE UR QL SCN: NEGATIVE
MONOCYTES # BLD AUTO: 0.3 10^3/UL (ref 0.1–1.4)
MONOCYTES NFR BLD AUTO: 5.6 % (ref 3–13)
NEUTROPHILS # BLD AUTO: 3.1 10^3/UL (ref 1.7–8.2)
NEUTS SEG NFR BLD AUTO: 53.4 % (ref 42–78)
PCP UR QL SCN: NEGATIVE
PH UR STRIP: 7 [PH] (ref 5–9)
PLATELET # BLD: 173 10^3/UL (ref 150–450)
POTASSIUM SERPL-SCNC: 4.6 MMOL/L (ref 3.6–5)
PROT SERPL-MCNC: 7.7 G/DL (ref 6.3–8.2)
PROT UR STRIP-MCNC: NEGATIVE MG/DL
RBC # BLD AUTO: 5.07 10^6/UL (ref 4.35–5.55)
SP GR UR STRIP: 1.01
TOTAL CELLS COUNTED % (AUTO): 100 %
URINE AMPHETAMINES SCREEN: NEGATIVE
URINE BENZODIAZEPINES SCREEN: NEGATIVE
URINE COCAINE SCREEN: NEGATIVE
URINE MARIJUANA (THC) SCREEN: NEGATIVE
UROBILINOGEN UR-MCNC: NEGATIVE MG/DL (ref ?–2)
WBC # BLD AUTO: 5.8 10^3/UL (ref 4–10.5)

## 2020-04-23 PROCEDURE — 80307 DRUG TEST PRSMV CHEM ANLYZR: CPT

## 2020-04-23 PROCEDURE — 96374 THER/PROPH/DIAG INJ IV PUSH: CPT

## 2020-04-23 PROCEDURE — 85025 COMPLETE CBC W/AUTO DIFF WBC: CPT

## 2020-04-23 PROCEDURE — 36415 COLL VENOUS BLD VENIPUNCTURE: CPT

## 2020-04-23 PROCEDURE — 84484 ASSAY OF TROPONIN QUANT: CPT

## 2020-04-23 PROCEDURE — 93010 ELECTROCARDIOGRAM REPORT: CPT

## 2020-04-23 PROCEDURE — 83690 ASSAY OF LIPASE: CPT

## 2020-04-23 PROCEDURE — 71046 X-RAY EXAM CHEST 2 VIEWS: CPT

## 2020-04-23 PROCEDURE — 93005 ELECTROCARDIOGRAM TRACING: CPT

## 2020-04-23 PROCEDURE — 99285 EMERGENCY DEPT VISIT HI MDM: CPT

## 2020-04-23 PROCEDURE — 80053 COMPREHEN METABOLIC PANEL: CPT

## 2020-04-23 PROCEDURE — 86900 BLOOD TYPING SEROLOGIC ABO: CPT

## 2020-04-23 PROCEDURE — 96361 HYDRATE IV INFUSION ADD-ON: CPT

## 2020-04-23 PROCEDURE — 81001 URINALYSIS AUTO W/SCOPE: CPT

## 2020-04-23 PROCEDURE — 86850 RBC ANTIBODY SCREEN: CPT

## 2020-04-23 PROCEDURE — 86901 BLOOD TYPING SEROLOGIC RH(D): CPT

## 2020-04-23 NOTE — ER DOCUMENT REPORT
ED Medical Screen (RME)





- General


Chief Complaint: Chest Pain


Stated Complaint: CHEST PAIN with throwing up blood


Time Seen by Provider: 04/23/20 11:46


Primary Care Provider: 


JANAY WILSON [Primary Care Provider] - Follow up as needed


Mode of Arrival: Ambulatory


Information source: Patient


Notes: 





40-year-old male presented to ED for complaint of chest pain x3 days.  He 

started throwing up blood last night and threw up blood again today with a lot 

of blood.  He states he is very weak hardly able to stand up on his feet.  He is

alert and oriented at this time.  He is able to answer questions appropriately.














I have greeted and performed a rapid initial assessment of this patient.  A 

comprehensive ED assessment and evaluation of the patient, analysis of test resu

lts and completion of medical decision making process will be conducted by an 

additional ED providers.


TRAVEL OUTSIDE OF THE [place] IN LAST 30 DAYS: No





- Related Data


Allergies/Adverse Reactions: 


                                        





No Known Allergies Allergy (Verified 12/07/19 15:35)


   











Past Medical History





- Past Medical History


Cardiac Medical History: Reports: Hx Hypertension


Neurological Medical History: Reports: Hx Migraine


Endocrine Medical History: Reports: Hx Diabetes Mellitus Type 2


Renal/ Medical History: Denies: Hx Peritoneal Dialysis


Malignancy Medical History: Reports Hx Renal (Kidney) Cancer


Psychiatric Medical History: Reports: Hx Post Traumatic Stress Disorder


Past Surgical History: Reports: Hx Kidney (Renal Surgery) - micro-disection of 

left kidney





Doctor's Discharge





- Discharge


Referrals: 


[facility] [[facility] Care Provider] - Follow up as needed

## 2020-04-23 NOTE — ER DOCUMENT REPORT
ED General





- General


Chief Complaint: Chest Pain


Stated Complaint: CHEST PAIN with throwing up blood


Time Seen by Provider: 20 11:46


Primary Care Provider: 


[facility] [Primary Care Provider] - Follow up as needed


Mode of Arrival: Ambulatory


Notes: 





40-year-old man presents to the emergency department with a complaint of chest 

pain.  He rates the pain 8/10 and states he has been having intermittent 

episodes of chest pain over the past few days.  He was diagnosed with H. pylori 

last week.  He also had episode of vomiting with bright red blood.  He denies, 

vomiting at this time.  He also denies dizziness or lightheadedness, no syncope 

or associated fever.  Complains of shortness of breath at this time.


TRAVEL OUTSIDE OF THE [place] IN LAST 30 DAYS: No





- Related Data


Allergies/Adverse Reactions: 


                                        





No Known Allergies Allergy (Verified 19 15:35)


   











Past Medical History





- General


Information source: Patient





- Social History


Smoking Status: Never Smoker


Chew tobacco use (# tins/day): No


Frequency of alcohol use: None


Drug Abuse: None


Family History: Reviewed & Not Pertinent


Patient has suicidal ideation: No


Patient has homicidal ideation: No





- Past Medical History


Cardiac Medical History: Reports: Hx Hypertension


Neurological Medical History: Reports: Hx Migraine


Endocrine Medical History: Reports: Hx Diabetes Mellitus Type 2


Renal/ Medical History: Denies: Hx Peritoneal Dialysis


Malignancy Medical History: Reports Hx Renal (Kidney) Cancer


Psychiatric Medical History: Reports: Hx Post Traumatic Stress Disorder


Past Surgical History: Reports: Hx Kidney (Renal Surgery) - micro-disection of 

left kidney





Review of Systems





- Review of Systems


Notes: 





Constitutional: + Fatigue


HENT: Negative for sore throat.


Eyes: Negative for visual changes.


Cardiovascular: + Chest pain.


Respiratory: Negative for shortness of breath.


Gastrointestinal: Negative for abdominal pain, vomiting or diarrhea.


Genitourinary: Negative for dysuria.


Musculoskeletal: Negative for back pain.


Skin: Negative for rash.


Neurological: Negative for headaches, weakness or numbness.





10 point ROS negative except as marked above and in HPI.





Physical Exam





- Vital signs


Vitals: 


                                        











Temp


 


 97.8 F 


 


 20 11:36














- Notes


Notes: 





PHYSICAL EXAMINATION:


 


Physical Exam:


General: Well-nourished well-developed 40-year-old man in no acute distress


HEENT: NC/AT, pupils equal round and reactive to light, MM moist,nares clear, 

oropharynx clear, airway patent


Neck: supple, no adenopathy, no masses.  Good range of motion


Lungs: clear, no wheezing, no rales no rhonchi


CVS: Regular rate and rhythm no murmur gallop or rub


Abdomen: Soft, active, nontender, no masses, no hepatosplenomegaly


Ext:   No edema, clubbing or cyanosis.


Neuro: Alert and responsive, moving all 4 extremities on command, cranial nerves

intact, no focal findings


Skin: Intact no open lesions, no rash


PSYCH: Normal mood, normal affect.


 








Course





- Re-evaluation


Re-evalutation: 





20 15:28


Evaluation of the patient's chest discomfort reveals a EKG which is normal, 

chest x-ray clear, cardiac enzymes negative and unresponsiveness to sublingual 

nitroglycerin or GI cocktail.  Patient does have atypical chest pain, apparently

seen by his primary care doctor he was treated for H. pylori I have asked him 

that he could complete the medications for that condition and then follow-up 

with his primary care doctor regarding the symptom complex.  I reassured him 

that this does not look like CAD or acute vascular obstruction.





- Vital Signs


Vital signs: 


                                        











Temp Pulse Resp BP Pulse Ox


 


 97.8 F      15   130/98 H  99 


 


 20 14:01     20 14:01  20 14:01  20 14:01














- Laboratory


Result Diagrams: 


                                 20 11:07





                                 20 11:07


Laboratory results interpreted by me: 


                                        











  20





  11:07


 


Glucose  182 H














- Diagnostic Test


Radiology reviewed: Image reviewed, Reports reviewed - Chest x-ray: Normal 

cardiac silhouette, no acute infiltrates or effusion.





- EKG Interpretation by Me


EKG shows normal: Sinus rhythm - Normal sinus rhythm rate of 79, no acute ST or 

T wave abnormalities           interpretation: Normal electrocardiogram,





Discharge





- Discharge


Clinical Impression: 


 Atypical chest pain, H pylori ulcer





Condition: Good


Disposition: HOME, SELF-CARE


Instructions:  Helicobacter Pylori Treatment ([facility]), Helicobacter Infection ([facility])


Additional Instructions: 


You are seen in the emergency department today and evaluated for chest pain, 

your chest pain does not appear to be cardiac in origin, I suggest that you 

complete the treatment of H. pylori and the medications which you were given.  

Following up with your physician for further outpatient evaluation and treatment

as necessary would be the next step.  If your symptoms are worsening or if you 

have other concerns you may return to the emergency department for further 

evaluation and treatment





HOME CARE INSTRUCTIONS & INFORMATION:  Thank you for choosing us for your 

medical needs. We hope you're satisfied with the care you received.  After you 

leave, you must properly care for your problem and, at the same time, observe 

its progress.  Any condition can change.  Some illnesses can change rapidly over

hours or days.  If your condition worsens, return to the Emergency Department or

see your physician promptly.





ABOUT YOUR X-RAYS AND EKG'S:   If you had an EKG or X-rays taken, they have been

read by the Emergency Physician. The X-rays and EKG's will also be read by a 

Radiologist or Cardiologist within 24 hours.  If discrepancies are noted, you 

will be notified by telephone.  Please be certain the ED has a correct telephone

number & address where you can be reached.  Also, realize that some fractures or

abnormalities do not show up on initial X-rays.  If your symptoms continue, see 

your physician.





ABOUT YOUR LABORATORY TEST:   If you had laboratory tests, the results have been

reviewed by the Emergency Physician.  Some test results (for example cultures) 

may not be available for several days.  You will be contacted if any test result

shows you need additional treatment.  Please be certain the ED has a correct 

telephone number and address where you can be reached.





ABOUT YOUR MEDICATIONS:  You will receive instructions on how to take your 

medicine on the prescription label you receive.  Additional information may be 

provided by the Pharmacy.  If you have questions afterwards, call the ED for 

clarification or further instructions.  Some prescribed medications may cause 

drowsiness.  Do not perform tasks such as driving a car or operating machinery 

without consulting your Pharmacist.  If you feel you need a refill of pain 

medication, your condition will need re-evaluation.  Please do not call for a r

efill of any medication.





ABOUT YOUR SIGNATURE:   Signature of this document acknowledges to followin. Understanding that you received emergency treatment and that you may be 

released before al medical problems are known or treated. Please be certain   

the ED has a correct phone number & address where you can be reached.


   2. Acknowledgement that you will arrange for follow-up care as recommended.


   3. Authorization for the Emergency Physician to provide information to your 

follow-up Physician in order to maximize your care.





AT ANY TIME, IF YOUR SYMPTOMS CHANGE SIGNIFICANTLY OR WORSEN OR YOU DEVELOP NEW 

SYMPTOMS, RETURN TO THE EMERGENCY DEPARTMENT IMMEDIATELY FOR RE-EVALUATION.





OUR GOAL IS TO PROVIDE EXCELLENT MEDICAL CARE!





WE HOPE THAT WE HAVE MET YOUR EXPECTATIONS DURING YOUR EMERGENCY DEPARTMENT 

VISIT AND THAT YOU FEEL YOU HAVE RECEIVED EXCELLENT CARE!











Referrals: 


[facility] [Primary Care Provider] - Follow up as needed

## 2020-04-23 NOTE — RADIOLOGY REPORT (SQ)
EXAM DESCRIPTION:  CHEST 2 VIEWS



IMAGES COMPLETED DATE/TIME:  4/23/2020 1:12 pm



REASON FOR STUDY:  Pain chest



COMPARISON:  10/24/2019



EXAM PARAMETERS:  NUMBER OF VIEWS: two views

TECHNIQUE: Digital Frontal and Lateral radiographic views of the chest acquired.

RADIATION DOSE: NA

LIMITATIONS: none



FINDINGS:  LUNGS AND PLEURA: No opacities, masses or pneumothorax. No pleural effusion.

MEDIASTINUM AND HILAR STRUCTURES: No masses or contour abnormalities.

HEART AND VASCULAR STRUCTURES: Heart normal size.  No evidence for failure.

BONES: No acute findings.

HARDWARE: None in the chest.

OTHER: No other significant finding.



IMPRESSION:  NO ACUTE RADIOGRAPHIC FINDING IN THE CHEST.



TECHNICAL DOCUMENTATION:  JOB ID:  [id number]

 2011 [email or web address]- All Rights Reserved



Reading location - IP/workstation name: [email or web address]

## 2020-06-07 ENCOUNTER — HOSPITAL ENCOUNTER (EMERGENCY)
Dept: HOSPITAL 62 - ER | Age: 40
Discharge: HOME | End: 2020-06-07
Payer: OTHER GOVERNMENT

## 2020-06-07 VITALS — SYSTOLIC BLOOD PRESSURE: 173 MMHG | DIASTOLIC BLOOD PRESSURE: 105 MMHG

## 2020-06-07 DIAGNOSIS — I10: ICD-10-CM

## 2020-06-07 DIAGNOSIS — R21: ICD-10-CM

## 2020-06-07 DIAGNOSIS — T78.40XA: Primary | ICD-10-CM

## 2020-06-07 DIAGNOSIS — E11.9: ICD-10-CM

## 2020-06-07 PROCEDURE — 99283 EMERGENCY DEPT VISIT LOW MDM: CPT

## 2020-06-07 NOTE — ER DOCUMENT REPORT
HPI





- HPI


Time Seen by Provider: 06/07/20 20:32


Pain Level: Denies


Notes: 





Patient is a 40-year-old male presenting to the emergency department chief 

complaint of concern for allergic reaction.  Patient was discharged from [facility] on Friday for what he describes as spinal inflammation.  He 

states while he was in the hospital he was receiving IV steroids daily.  He 

states when he went home they started him on oral steroids.  He believes he is 

having an allergic reaction to the oral steroids because he has a rash to his 

bilateral arms and chest.  He denies any difficulty swallowing, difficulty 

speaking or sore throat.  He denies any allergies previous to this.








- REPRODUCTIVE


Reproductive: DENIES: Pregnant:





Past Medical History





- General


Information source: Patient





- Social History


Smoking Status: Never Smoker


Frequency of alcohol use: None


Family History: Reviewed & Not Pertinent


Patient has homicidal ideation: No





- Past Medical History


Cardiac Medical History: Reports: Hx Hypertension


Neurological Medical History: Reports: Hx Migraine


Endocrine Medical History: Reports: Hx Diabetes Mellitus Type 2


Renal/ Medical History: Denies: Hx Peritoneal Dialysis


Malignancy Medical History: Reports Hx Renal (Kidney) Cancer


Psychiatric Medical History: Reports: Hx Post Traumatic Stress Disorder


Past Surgical History: Reports: Hx Kidney (Renal Surgery) - micro-disection of 

left kidney





Vertical Provider Document





- CONSTITUTIONAL


Notes: 





PHYSICAL EXAMINATION:





GENERAL: Well-appearing, well-nourished and in no acute distress.





HEAD: Atraumatic, normocephalic.  No lip swelling.





EYES: Pupils equal round extraocular movements intact,  conjunctiva are normal.





ENT: Nares patent, airway patent, no obvious erythema or edema.





NECK: Normal range of motion





LUNGS: No respiratory distress, lung sounds clear and equal bilaterally.





Musculoskeletal: Normal range of motion





NEUROLOGICAL:  Normal speech, normal gait. 





PSYCH: Normal mood, normal affect.





SKIN: Warm, Dry, normal turgor, scattered maculopapular rash to patient's 

bilateral arms and chest.





- INFECTION CONTROL


TRAVEL OUTSIDE OF THE [place] IN LAST 30 DAYS: No





Course





- Re-evaluation


Re-evalutation: 





Patient appears well, nontoxic, vital signs within normal limits.  Patient is 

not having an anaphylactic reaction.  Patient does have a scattered 

maculopapular rash, no hives.  Will advise patient to stop taking the oral 

steroids that were prescribed by his provider and start taking Benadryl and 

Pepcid.  Due to the reasoning behind him being on the steroids I have advised 

him to call his provider tomorrow morning to discuss with them other options as 

he may need to be on steroids of a different type.  Patient verbalized 

understanding and agreement with this plan.








- Vital Signs


Vital signs: 


                                        











Temp Pulse Resp BP Pulse Ox


 


 98.5 F   77   18   173/105 H  99 


 


 06/07/20 20:32  06/07/20 20:08  06/07/20 20:08  06/07/20 20:08  06/07/20 20:08














Discharge





- Discharge


Clinical Impression: 


Allergic reaction


Qualifiers:


 Encounter type: initial encounter Qualified Code(s): T78.40XA - Allergy, 

unspecified, initial encounter





Condition: Stable


Disposition: HOME, SELF-CARE


Additional Instructions: 


Please take Pepcid 40 mg twice daily and Benadryl 50 mg every 6 hours.  You can 

purchase both of these over-the-counter.  Start taking them tomorrow morning.  

Please do not take your steroid for the next few days, follow-up with either 

your primary care provider or your provider at [facility] as they may want to 

restart you on some type of steroid for your back inflammation that you 

originally placed on it for.  Return to the emergency department with any new or

worsening symptoms to include difficulty swallowing, scratchy throat or any 

other concerns.





Referrals: 


[facility] [[facility] Care [facility]] - Follow up as needed

## 2020-06-17 ENCOUNTER — HOSPITAL ENCOUNTER (EMERGENCY)
Dept: HOSPITAL 62 - ER | Age: 40
LOS: 1 days | Discharge: HOME | End: 2020-06-18
Payer: OTHER GOVERNMENT

## 2020-06-17 DIAGNOSIS — E09.65: Primary | ICD-10-CM

## 2020-06-17 DIAGNOSIS — T38.0X5A: ICD-10-CM

## 2020-06-17 DIAGNOSIS — Z85.528: ICD-10-CM

## 2020-06-17 DIAGNOSIS — I10: ICD-10-CM

## 2020-06-17 LAB
ADD MANUAL DIFF: NO
ALBUMIN SERPL-MCNC: 4.4 G/DL (ref 3.5–5)
ALP SERPL-CCNC: 84 U/L (ref 38–126)
ANION GAP SERPL CALC-SCNC: 7 MMOL/L (ref 5–19)
APPEARANCE UR: (no result)
APTT PPP: YELLOW S
AST SERPL-CCNC: 28 U/L (ref 17–59)
BASOPHILS # BLD AUTO: 0 10^3/UL (ref 0–0.2)
BASOPHILS NFR BLD AUTO: 0.3 % (ref 0–2)
BILIRUB DIRECT SERPL-MCNC: 0 MG/DL (ref 0–0.4)
BILIRUB SERPL-MCNC: 0.4 MG/DL (ref 0.2–1.3)
BILIRUB UR QL STRIP: NEGATIVE
BUN SERPL-MCNC: 13 MG/DL (ref 7–20)
CALCIUM: 9.7 MG/DL (ref 8.4–10.2)
CHLORIDE SERPL-SCNC: 100 MMOL/L (ref 98–107)
CO2 SERPL-SCNC: 28 MMOL/L (ref 22–30)
EOSINOPHIL # BLD AUTO: 0 10^3/UL (ref 0–0.6)
EOSINOPHIL NFR BLD AUTO: 0.2 % (ref 0–6)
ERYTHROCYTE [DISTWIDTH] IN BLOOD BY AUTOMATED COUNT: 14.5 % (ref 11.5–14)
GLUCOSE SERPL-MCNC: 237 MG/DL (ref 75–110)
GLUCOSE UR STRIP-MCNC: >=500 MG/DL
HCT VFR BLD CALC: 44 % (ref 37.9–51)
HGB BLD-MCNC: 14.8 G/DL (ref 13.5–17)
KETONES UR STRIP-MCNC: NEGATIVE MG/DL
LYMPHOCYTES # BLD AUTO: 2.3 10^3/UL (ref 0.5–4.7)
LYMPHOCYTES NFR BLD AUTO: 19.4 % (ref 13–45)
MCH RBC QN AUTO: 30.1 PG (ref 27–33.4)
MCHC RBC AUTO-ENTMCNC: 33.6 G/DL (ref 32–36)
MCV RBC AUTO: 89 FL (ref 80–97)
MONOCYTES # BLD AUTO: 0.8 10^3/UL (ref 0.1–1.4)
MONOCYTES NFR BLD AUTO: 6.8 % (ref 3–13)
NEUTROPHILS # BLD AUTO: 8.8 10^3/UL (ref 1.7–8.2)
NEUTS SEG NFR BLD AUTO: 73.3 % (ref 42–78)
NITRITE UR QL STRIP: NEGATIVE
PH UR STRIP: 6 [PH] (ref 5–9)
PLATELET # BLD: 193 10^3/UL (ref 150–450)
POTASSIUM SERPL-SCNC: 4.3 MMOL/L (ref 3.6–5)
PROT SERPL-MCNC: 7.6 G/DL (ref 6.3–8.2)
PROT UR STRIP-MCNC: NEGATIVE MG/DL
RBC # BLD AUTO: 4.92 10^6/UL (ref 4.35–5.55)
SP GR UR STRIP: 1.02
TOTAL CELLS COUNTED % (AUTO): 100 %
UROBILINOGEN UR-MCNC: NEGATIVE MG/DL (ref ?–2)
WBC # BLD AUTO: 12 10^3/UL (ref 4–10.5)

## 2020-06-17 PROCEDURE — 81001 URINALYSIS AUTO W/SCOPE: CPT

## 2020-06-17 PROCEDURE — 85025 COMPLETE CBC W/AUTO DIFF WBC: CPT

## 2020-06-17 PROCEDURE — 82962 GLUCOSE BLOOD TEST: CPT

## 2020-06-17 PROCEDURE — 80053 COMPREHEN METABOLIC PANEL: CPT

## 2020-06-17 PROCEDURE — 99284 EMERGENCY DEPT VISIT MOD MDM: CPT

## 2020-06-17 PROCEDURE — 36415 COLL VENOUS BLD VENIPUNCTURE: CPT

## 2020-06-17 NOTE — ER DOCUMENT REPORT
ED Medical Screen (RME)





- General


Chief Complaint: High Blood Sugar


Stated Complaint: HIGH BLOOD SUGAR


Time Seen by Provider: 06/17/20 20:24


Primary Care Provider: 


[facility] [[facility] Care Provider] - Follow up as needed


Mode of Arrival: Ambulatory


Information source: Patient


Notes: 





HPI;-year-old male past medical history significant for non-insulin-dependent 

diabetes, PE, kidney cancer, transverse myelitis presents emergency room stating

that his blood sugar tonight was reading greater than 500.  States he has been 

on prednisone for about 10 to 12 days for the transverse myelitis.  Today is the

first day that is noticed his blood sugar that high.  States they have been 

running in the high 200s has been taking his metformin as prescribed.  Offers no

other complaints.





PE: Alert and oriented x3.  Mild distress noted.  Lungs: Clear to auscultation 

without rales rhonchi wheezes.  Heart: Regular rate rhythm without murmurs rubs 

or gallops








I have greeted and performed a rapid initial assessment of this patient.  A 

comprehensive ED assessment and evaluation of the patient, analysis of test 

results and completion of the medical decision making process will be conducted 

by additional ED providers.  I have specifically instructed the patient or 

family members with the patient to immediately return to any nursing staff 

should anything change in the patient's condition or with their chief complaint.


TRAVEL OUTSIDE OF THE [place] IN LAST 30 DAYS: No





- Related Data


Allergies/Adverse Reactions: 


                                        





No Known Allergies Allergy (Verified 06/17/20 20:24)


   











Past Medical History





- Past Medical History


Cardiac Medical History: Reports: Hx Hypertension


Neurological Medical History: Reports: Hx Migraine


Endocrine Medical History: Reports: Hx Diabetes Mellitus Type 2


Renal/ Medical History: Denies: Hx Peritoneal Dialysis


Malignancy Medical History: Reports Hx Renal (Kidney) Cancer


Psychiatric Medical History: Reports: Hx Post Traumatic Stress Disorder


Past Surgical History: Reports: Hx Kidney (Renal Surgery) - micro-disection of 

left kidney





Doctor's Discharge





- Discharge


Referrals: 


[facility] [[facility]] - Follow up as needed

## 2020-06-18 VITALS — DIASTOLIC BLOOD PRESSURE: 107 MMHG | SYSTOLIC BLOOD PRESSURE: 150 MMHG

## 2020-06-18 NOTE — ER DOCUMENT REPORT
ED General





- General


Chief Complaint: High Blood Sugar


Stated Complaint: HIGH BLOOD SUGAR


Time Seen by Provider: 06/17/20 20:24


Primary Care Provider: 


[facility] [Primary Care Provider] - Follow up as needed


Mode of Arrival: Ambulatory


Notes: 





40-year-old male presents emergency department complaining of elevated blood 

sugar.  Patient states he has been on prednisone for approximately the past 12 

days for transverse myelitis, he is supposed to be on it for 2 more days.  

Patient is normally diet-controlled type II diabetic.  States that when he was 

in the hospital they were giving him insulin but since discharge she has not had

any medications for his elevated blood sugar.  States that his blood sugars have

slowly been creeping up at home, yesterday was 280, today was 343 and then just 

prior to arrival it was greater than 500.  Patient denies any new symptoms aside

from urinary frequency.  Denies fevers, denies dysuria, denies increasing 

weakness, states his weakness is actually improving.  Does state his back pain 

has been worsening since sitting in our chairs and/or beds this evening.  States

he missed his usual evening dose of gabapentin 900 mg and would like to have it 

here.


TRAVEL OUTSIDE OF THE [place] IN LAST 30 DAYS: No





- Related Data


Allergies/Adverse Reactions: 


                                        





No Known Allergies Allergy (Verified 06/17/20 20:24)


   











Past Medical History





- General


Information source: Patient





- Social History


Smoking Status: Former Smoker


Frequency of alcohol use: None


Drug Abuse: None


Family History: Reviewed & Not Pertinent


Patient has homicidal ideation: No





- Past Medical History


Cardiac Medical History: Reports: Hx Hypertension


Neurological Medical History: Reports: Hx Migraine


Endocrine Medical History: Reports: Hx Diabetes Mellitus Type 2


Renal/ Medical History: Denies: Hx Peritoneal Dialysis


Malignancy Medical History: Reports Hx Renal (Kidney) Cancer


Psychiatric Medical History: Reports: Hx Post Traumatic Stress Disorder


Past Surgical History: Reports: Hx Kidney (Renal Surgery) - micro-disection of l

eft kidney





Review of Systems





- Review of Systems


Constitutional: No symptoms reported


Genitourinary: See HPI, Frequency


Musculoskeletal: See HPI, Back pain


-: Yes All other systems reviewed and negative





Physical Exam





- Vital signs


Vitals: 





                                        











Temp


 


 98.8 F 


 


 06/17/20 20:25











Interpretation: Normal





- Notes


Notes: 





GENERAL: Alert, interacts well.  No acute distress.


HEAD: Normocephalic, atraumatic


EYES: Pupils equal, round and reactive to light, extraocular movements intact.


ENT: Oral mucosa moist, tongue midline. 


NECK: Full range of motion, supple, trachea midline.


LUNGS: Clear to auscultation bilaterally, no wheezes, rales or rhonchi, no 

respiratory distress.


HEART: Regular rate and rhythm, no murmurs, gallops, rubs.  


ABDOMEN: Soft, nontender, nondistended, bowel sounds present in all 4 quadrants.

 


NEUROLOGICAL: Alert and oriented x3, normal speech.


PSYCH: Normal mood, normal affect.


SKIN: Warm, Dry, normal turgor.  





Course





- Re-evaluation


Re-evalutation: 





06/18/20 00:27


CBC shows slight leukocytosis of 12.0, CMP shows mild hyponatremia at 134.8, 

glucose elevated at 237, urinalysis shows glucose but no signs of infection.  

Patient's glucose is elevated because he has been on prednisone for the past 12 

days.  He has 2 more days left.  Discussed with patient that I think the best 

course of action is for him to just increase his water intake and try to 

exercise a little bit more even if it means walking with a cane however patient 

is quite uncomfortable with his blood sugar spiking to 500.  I did discuss that 

we could start him on very low-dose short acting insulin.  We will do teaching 

here on how to administer insulin.  He has had it in the hospital previously.  I

did order 2 units of insulin to treat his blood sugar of 237 however it is now 

147 after he is been waiting in the emergency department so we believe saline to

teach the subcu injections.





Patient is aware of the risks of glycemia, he has been warned of the signs.  

Patient is being given a very low sliding scale.  Discharged home.





- Vital Signs


Vital signs: 





                                        











Temp Pulse Resp BP Pulse Ox


 


 98.8 F             


 


 06/17/20 20:25            














- Laboratory


Result Diagrams: 


                                 06/17/20 22:00





                                 06/17/20 22:00


Laboratory results interpreted by me: 





                                        











  06/17/20 06/17/20 06/17/20





  20:30 22:00 22:00


 


WBC   12.0 H 


 


RDW   14.5 H 


 


Absolute Neuts (auto)   8.8 H 


 


Sodium    134.8 L


 


Glucose    237 H


 


POC Glucose  279 H  


 


ALT    107 H


 


Urine Glucose (UA)   














  06/17/20





  22:00


 


WBC 


 


RDW 


 


Absolute Neuts (auto) 


 


Sodium 


 


Glucose 


 


POC Glucose 


 


ALT 


 


Urine Glucose (UA)  >=500 H














Discharge





- Discharge


Clinical Impression: 


 Steroid-induced hyperglycemia





Type 2 diabetes mellitus with hyperglycemia


Qualifiers:


 Diabetes mellitus long term insulin use: without long term use Qualified Code(s

): E11.65 - Type 2 diabetes mellitus with hyperglycemia





Condition: Stable


Disposition: HOME, SELF-CARE


Additional Instructions: 


The elevation in your blood sugar is likely coming from the steroid use.  

Shortly after you stop using steroids you should see your blood sugars 

normalize.  Please check your blood sugar 4 times a day, just before each meal 

and just before bedtime.  If your blood sugar is greater than 200 then give 

yourself 2 units of insulin, greater than 300 give yourself 3 units of insulin, 

greater than 400 give yourself 4 units of insulin, greater than 5 units give 

yourself 5 units of insulin.  If your glucometer reads high please return to the

emergency department.





Please also return to the emergency department for fever, confusion, sweating or

difficulty talking.


Prescriptions: 


Insulin Regular, Human [Novolin R Flexpen] See Protocol SQ ACHS #1 [email or web address]


Referrals: 


[facility] [Primary Care Provider] - Follow up as needed

## 2020-07-29 ENCOUNTER — HOSPITAL ENCOUNTER (EMERGENCY)
Dept: HOSPITAL 62 - ER | Age: 40
Discharge: HOME | End: 2020-07-29
Payer: OTHER GOVERNMENT

## 2020-07-29 VITALS — SYSTOLIC BLOOD PRESSURE: 150 MMHG | DIASTOLIC BLOOD PRESSURE: 99 MMHG

## 2020-07-29 DIAGNOSIS — M54.5: ICD-10-CM

## 2020-07-29 DIAGNOSIS — E11.9: ICD-10-CM

## 2020-07-29 DIAGNOSIS — G89.29: ICD-10-CM

## 2020-07-29 DIAGNOSIS — R10.9: ICD-10-CM

## 2020-07-29 DIAGNOSIS — I10: ICD-10-CM

## 2020-07-29 DIAGNOSIS — M54.9: Primary | ICD-10-CM

## 2020-07-29 DIAGNOSIS — M54.6: ICD-10-CM

## 2020-07-29 LAB
APPEARANCE UR: CLEAR
APTT PPP: YELLOW S
BILIRUB UR QL STRIP: NEGATIVE
GLUCOSE UR STRIP-MCNC: NEGATIVE MG/DL
KETONES UR STRIP-MCNC: NEGATIVE MG/DL
NITRITE UR QL STRIP: NEGATIVE
PH UR STRIP: 5 [PH] (ref 5–9)
PROT UR STRIP-MCNC: NEGATIVE MG/DL
SP GR UR STRIP: 1.03
UROBILINOGEN UR-MCNC: NEGATIVE MG/DL (ref ?–2)

## 2020-07-29 PROCEDURE — 72128 CT CHEST SPINE W/O DYE: CPT

## 2020-07-29 PROCEDURE — 72131 CT LUMBAR SPINE W/O DYE: CPT

## 2020-07-29 PROCEDURE — 81001 URINALYSIS AUTO W/SCOPE: CPT

## 2020-07-29 PROCEDURE — 99284 EMERGENCY DEPT VISIT MOD MDM: CPT

## 2020-07-29 PROCEDURE — 96372 THER/PROPH/DIAG INJ SC/IM: CPT

## 2020-07-29 NOTE — ER DOCUMENT REPORT
HPI





- HPI


Time Seen by Provider: 07/29/20 01:26


Pain Level: 5


Notes: 





40-year-old male patient presents emergency department chief complaint of back 

pain.  Patient reports longstanding history of back pain, states this feels 

worse than usual.  He states it is in the middle to low back and radiates down 

his right side.  He does have a history of kidney stones, states this feels 

different.  He is currently seeing neurology and a spine specialist to figure 

out what is causing his chronic back pain.  He denies any saddle anesthesia, 

denies any loss of control bowel or bladder function denies any urinary 

retention.








- ROS


Systems Reviewed and Negative: Yes All other systems reviewed and negative





- REPRODUCTIVE


Reproductive: DENIES: Pregnant:





- MUSCULOSKELETAL


Musculoskeletal: REPORTS: Back Pain





Past Medical History





- General


Information source: Patient





- Social History


Smoking Status: Never Smoker


Family History: Reviewed & Not Pertinent


Patient has homicidal ideation: No





- Past Medical History


Cardiac Medical History: Reports: Hx Hypertension


Neurological Medical History: Reports: Hx Migraine


Endocrine Medical History: Reports: Hx Diabetes Mellitus Type 2


Renal/ Medical History: Denies: Hx Peritoneal Dialysis


Malignancy Medical History: Reports Hx Renal (Kidney) Cancer


Psychiatric Medical History: Reports: Hx Post Traumatic Stress Disorder


Past Surgical History: Reports: Hx Kidney (Renal Surgery) - micro-disection of 

left kidney





Vertical Provider Document





- CONSTITUTIONAL


Notes: 





PHYSICAL EXAMINATION:





GENERAL: Well-appearing, well-nourished and in no acute distress.





HEAD: Atraumatic, normocephalic.





EYES: Pupils equal round and reactive to light, extraocular movements intact, 

sclera anicteric, conjunctiva are normal.





ENT: Nares patent, oropharynx clear without exudates.  Moist mucous membranes.





NECK: Normal range of motion, supple without lymphadenopathy





LUNGS: Breath sounds clear to auscultation bilaterally and equal.  No wheezes 

rales or rhonchi.





HEART: Regular rate and rhythm without murmurs





ABDOMEN: Soft, nontender, nondistended abdomen.  No guarding, no rebound.  No 

masses appreciated.





Musculoskeletal: Normal range of motion, no pitting or edema.  No cyanosis.  

Tenderness to palpation in the lumbar and thoracic region, no vertebral 

tenderness, step-off or deformity.





NEUROLOGICAL: Cranial nerves grossly intact.  Normal speech, normal gait.  

Normal sensory, motor exams 





PSYCH: Normal mood, normal affect.





SKIN: Warm, Dry, normal turgor, no rashes or lesions noted.





- INFECTION CONTROL


TRAVEL OUTSIDE OF THE [place] IN LAST 30 DAYS: No





Course





- Re-evaluation


Re-evalutation: 





Patient appears well, nontoxic, likely acute on chronic back pain.  He has not 

had imaging done in quite some time, he states his back pain is worse than 

usual.  CT images will be obtained.





CT is unremarkable of the lumbar and thoracic spine.  Patient does report some 

relief of his symptoms after administration of medications in the emergency 

department.  He does have an appointment coming up with his neurologist this 

Friday.  He was encouraged to keep that appointment.  Patient verbalizes 

understanding and agreement with plan.








- Vital Signs


Vital signs: 


                                        











Temp Pulse Resp BP Pulse Ox


 


 98.1 F   66   16   150/99 H  100 


 


 07/29/20 04:56  07/29/20 04:56  07/29/20 04:56  07/29/20 04:56  07/29/20 04:56














- Laboratory


Laboratory results interpreted by me: 


                                        











  07/29/20





  03:00


 


Urine Blood  SMALL H














Discharge





- Discharge


Clinical Impression: 


Back pain


Qualifiers:


 Back pain location: back pain in unspecified location Chronicity: chronic Back 

pain laterality: bilateral Qualified Code(s): M54.9 - Dorsalgia, unspecified; 

G89.29 - Other chronic pain





Condition: Stable


Disposition: HOME, SELF-CARE


Additional Instructions: 


Please take medication as prescribed.  It should not be a problem to take this 

with the other medications that you are currently taking.  Please keep the 

follow-up appointment you have with your doctor for Friday.  Take the copy of 

the CAT scan results to him for his review.  Return to the emergency department 

with any new or worsening symptoms to include development of bowel or urinary 

incontinence, urinary retention, development of fever or any other concerning 

symptom, were happy to reevaluate you at any time.





Prescriptions: 


Cyclobenzaprine HCl [Flexeril 10 mg Tablet] 10 mg PO TIDP PRN #20 tab


 PRN Reason: 


Referrals: 


[facility] [Primary Care Provider] - Follow up as needed

## 2020-07-29 NOTE — ER DOCUMENT REPORT
ED Medical Screen (RME)





- General


Chief Complaint: Back Pain


Stated Complaint: FLANK PAIN


Time Seen by Provider: 07/29/20 01:26


Primary Care Provider: 


JANAY WILSON [Primary Care Provider] - Follow up as needed


Mode of Arrival: Wheelchair


Information source: Patient


Notes: 





40-year-old male patient presents emergency department chief complaint of back 

pain.  Patient reports longstanding history of back pain, states this feels 

worse than usual.  He states it is in the middle to low back and radiates down 

his right side.  He does have a history of kidney stones, states this feels 

different.  He is currently seeing neurology and a spine specialist to figure 

out what is causing his chronic back pain.  He denies any saddle anesthesia, 

denies any loss of control bowel or bladder function denies any urinary 

retention.





Tenderness to palpation in the thoracic and lumbar region, no vertebral step-off

or deformity.





I have greeted and performed a rapid initial assessment of this patient.  A 

comprehensive ED assessment and evaluation of the patient, analysis of test 

results and completion of the medical decision making process will be conducted 

by additional ED providers.  I have specifically instructed the patient or 

family members with the patient to immediately return to any nursing staff 

should anything change in the patient's condition or with their chief complaint.





TRAVEL OUTSIDE OF THE [place] IN LAST 30 DAYS: No





- Related Data


Allergies/Adverse Reactions: 


                                        





No Known Allergies Allergy (Verified 07/29/20 00:59)


   








Home Medications: Gabapentin.  oxycodone





Past Medical History





- Past Medical History


Cardiac Medical History: Reports: Hx Hypertension


Neurological Medical History: Reports: Hx Migraine


Endocrine Medical History: Reports: Hx Diabetes Mellitus Type 2


Renal/ Medical History: Denies: Hx Peritoneal Dialysis


Malignancy Medical History: Reports Hx Renal (Kidney) Cancer


Psychiatric Medical History: Reports: Hx Post Traumatic Stress Disorder


Past Surgical History: Reports: Hx Kidney (Renal Surgery) - micro-disection of 

left kidney





Physical Exam





- Vital signs


Vitals: 





                                        











Temp Pulse Resp BP Pulse Ox


 


 98.1 F   61   20   179/99 H  96 


 


 07/29/20 00:42  07/29/20 00:42  07/29/20 00:42  07/29/20 00:42  07/29/20 00:42














Course





- Vital Signs


Vital signs: 





                                        











Temp Pulse Resp BP Pulse Ox


 


 98.1 F   61   20   179/99 H  96 


 


 07/29/20 00:42  07/29/20 00:42  07/29/20 00:42  07/29/20 00:42  07/29/20 00:42














Doctor's Discharge





- Discharge


Referrals: 


[facility] [Primary Care Provider] - Follow up as needed

## 2020-07-29 NOTE — RADIOLOGY REPORT (SQ)
CT thoracic spine without contrast on  7/29/2020 at 2:00 AM 



CLINICAL INDICATION: Back pain



TECHNIQUE: Multiple axial images are obtained throughout the

thoracic spine without the administration of contrast. Sagittal

and coronal reformatted images are also performed and reviewed.

This exam was performed according to our departmental

dose-optimization program, which includes automated exposure

control, adjustment of the mA and/or kV according to patient size

and/or use of iterative reconstruction technique. 

Total DLP is 672.89 mGy*cm.



COMPARISON:  10/24/2019



FINDINGS: Reformatted images reveal normal alignment of the

thoracic spine. There are no acute fracture lines. No definite

disc herniation is noted. No level of canal stenosis or foraminal

narrowing is noted. There are trace bilateral pleural effusions.

No other bony or soft tissue abnormality is noted.



IMPRESSION: No acute abnormality in the thoracic spine.

## 2020-07-29 NOTE — RADIOLOGY REPORT (SQ)
CT lumbar spine without contrast on  7/29/2020 at 1:53 AM 



CLINICAL INDICATION: Low back pain



TECHNIQUE: Multiple axial images are obtained throughout the

lumbar spine without the administration of contrast. Sagittal and

coronal reformatted images are also performed and reviewed. This

exam was performed according to our departmental

dose-optimization program, which includes automated exposure

control, adjustment of the mA and/or kV according to patient size

and/or use of iterative reconstruction technique. 

Total DLP is 598.24 mGy*cm. 



COMPARISON: None



FINDINGS: Reformatted images reveal normal alignment of the

lumbar spine. There are no acute fracture lines. There is a 4-5

mm nonobstructing right renal stone. No definite disc herniation

is noted. No level of canal stenosis or foraminal narrowing is

noted.



IMPRESSION:

1. No acute abnormality in the lumbar spine.

2. Right nephrolithiasis.

## 2020-09-08 ENCOUNTER — HOSPITAL ENCOUNTER (OUTPATIENT)
Dept: HOSPITAL 62 - RAD | Age: 40
End: 2020-09-08
Attending: FAMILY MEDICINE
Payer: OTHER GOVERNMENT

## 2020-09-08 DIAGNOSIS — C64.9: Primary | ICD-10-CM

## 2020-09-08 PROCEDURE — A9552 F18 FDG: HCPCS

## 2020-09-08 PROCEDURE — 78815 PET IMAGE W/CT SKULL-THIGH: CPT

## 2020-09-08 NOTE — RADIOLOGY REPORT (SQ)
EXAM DESCRIPTION:  PET CT SKULL/THIGH



IMAGES COMPLETED DATE/TIME:  9/8/2020 1:56 pm



REASON FOR STUDY:  LOWER EXTREMITIES PROGRESSING WEAKNESS, HISTORY OF RENAL CANCER



COMPARISON:  None.



RADIONUCLIDE AND DOSE:  11.4 mCi F18 FDG

The route of agent administration: Intravenous



FASTING BLOOD SUGAR:  150 mg/dl



CONTRAST TYPE AND DOSE:  No CT contrast given.



TECHNIQUE:  Blood glucose level was verified.  Above dose of FDG was injected intravenously.  2-D seg
mented attenuation correction images were obtained from the base of the skull to the midthighs.  Nonc
ontrast CT images were obtained for attenuation correction and fusion with emission images.  CT image
s were performed without oral or intravenous contrast and are not sensitive for parenchymal lesions. 
 A series of overlapping emission PET images were obtained.  Images reviewed and manipulated at [facility] work station by the radiologist.  Images stored on PACS.



LIMITATIONS:  None.



FINDINGS:  HEAD AND NECK: Minimal uptake within known enlarged right thyroid 2.0 SUV.

CHEST: No areas of abnormal metabolic activity in the chest.

ABDOMEN AND PELVIS: No areas of abnormal metabolic activity in the abdomen or pelvis.  Expected physi
ologic activity is present in the genitourinary system and bowel.

PROXIMAL LOWER EXTREMITIES: No areas of abnormal metabolic activity in the soft tissues of the lower 
extremities.

BONES: No abnormal metabolic activity in the visualized skeleton.

ADDITIONAL CT FINDINGS: Nonobstructing right renal calculus.  Unchanged appearance of enlarged right 
lobe and thyroid nodules.

OTHER: Blood pool 2.0 SUV.  Liver background 2.9 SUV.



IMPRESSION:  No evidence of hypermetabolic mass.



COMMENT:  Scanning was obtained to the level of the knees.



TECHNICAL DOCUMENTATION:  JOB ID:  [id number]

 2011 [employer]- All Rights Reserved



Reading location - IP/workstation name: [email or web address]

## 2022-08-28 NOTE — RADIOLOGY REPORT (SQ)
EXAM DESCRIPTION:  U/S THYROID/SFT TISS HD   NECK



COMPLETED DATE/TIME:  10/31/2019 2:07 pm



REASON FOR STUDY:  abnormal thyroid on scan



COMPARISON:  CT chest dated 10/24/2019



TECHNIQUE:  Dynamic and static gray-scale images acquired of the thyroid gland. Selected additional c
olor/power Doppler images recorded.  All images stored to PACS.



LIMITATIONS:  None.



FINDINGS:  RIGHT LOBE: Normal size.  The right lobe is heterogeneous in echotexture.  There are multi
ple solid nodules.  The nodules are Iso to hyperechoic.  The largest measures 3.9 x 2.6 x 3.1 cm.  Al
l appear to be wider than tall.

LEFT LOBE: Normal size.  Homogeneous echotexture.  No cystic or solid masses.

ISTHMUS: Normal size.  Homogeneous echotexture.  No cystic or solid masses.

OTHER: No other significant finding.



IMPRESSION:  Multiple right-sided thyroid nodules.  The largest measures 3.9 x 2.6 x 3.1 cm.  This co
rresponds to a TR 4 lesion and FNA is recommended.  The other 2 nodules correspond to TR 3 lesions.  
Based on size continued surveillance is recommended.



TECHNICAL DOCUMENTATION:  JOB ID:  [id number]

 2011 [employer]- All Rights Reserved



Reading location - IP/workstation name: [email or web address]